# Patient Record
Sex: MALE | Race: WHITE | NOT HISPANIC OR LATINO
[De-identification: names, ages, dates, MRNs, and addresses within clinical notes are randomized per-mention and may not be internally consistent; named-entity substitution may affect disease eponyms.]

---

## 2019-07-26 ENCOUNTER — TRANSCRIPTION ENCOUNTER (OUTPATIENT)
Age: 34
End: 2019-07-26

## 2020-02-06 ENCOUNTER — TRANSCRIPTION ENCOUNTER (OUTPATIENT)
Age: 35
End: 2020-02-06

## 2020-08-15 ENCOUNTER — TRANSCRIPTION ENCOUNTER (OUTPATIENT)
Age: 35
End: 2020-08-15

## 2021-06-09 ENCOUNTER — EMERGENCY (EMERGENCY)
Facility: HOSPITAL | Age: 36
LOS: 1 days | Discharge: HOME | End: 2021-06-09
Attending: EMERGENCY MEDICINE | Admitting: EMERGENCY MEDICINE
Payer: COMMERCIAL

## 2021-06-09 VITALS
OXYGEN SATURATION: 98 % | HEART RATE: 80 BPM | DIASTOLIC BLOOD PRESSURE: 86 MMHG | WEIGHT: 184.97 LBS | TEMPERATURE: 98 F | SYSTOLIC BLOOD PRESSURE: 119 MMHG | RESPIRATION RATE: 18 BRPM

## 2021-06-09 VITALS
OXYGEN SATURATION: 99 % | SYSTOLIC BLOOD PRESSURE: 120 MMHG | HEART RATE: 69 BPM | RESPIRATION RATE: 18 BRPM | DIASTOLIC BLOOD PRESSURE: 77 MMHG | TEMPERATURE: 98 F

## 2021-06-09 DIAGNOSIS — Z87.442 PERSONAL HISTORY OF URINARY CALCULI: ICD-10-CM

## 2021-06-09 DIAGNOSIS — N13.2 HYDRONEPHROSIS WITH RENAL AND URETERAL CALCULOUS OBSTRUCTION: ICD-10-CM

## 2021-06-09 DIAGNOSIS — R31.9 HEMATURIA, UNSPECIFIED: ICD-10-CM

## 2021-06-09 DIAGNOSIS — E10.9 TYPE 1 DIABETES MELLITUS WITHOUT COMPLICATIONS: ICD-10-CM

## 2021-06-09 LAB
ALBUMIN SERPL ELPH-MCNC: 3.9 G/DL — SIGNIFICANT CHANGE UP (ref 3.3–5)
ALP SERPL-CCNC: 65 U/L — SIGNIFICANT CHANGE UP (ref 40–120)
ALT FLD-CCNC: 12 U/L — SIGNIFICANT CHANGE UP (ref 10–45)
ANION GAP SERPL CALC-SCNC: 9 MMOL/L — SIGNIFICANT CHANGE UP (ref 5–17)
APPEARANCE UR: CLEAR — SIGNIFICANT CHANGE UP
AST SERPL-CCNC: 13 U/L — SIGNIFICANT CHANGE UP (ref 10–40)
BACTERIA # UR AUTO: PRESENT /HPF
BASOPHILS # BLD AUTO: 0.05 K/UL — SIGNIFICANT CHANGE UP (ref 0–0.2)
BASOPHILS NFR BLD AUTO: 0.7 % — SIGNIFICANT CHANGE UP (ref 0–2)
BILIRUB SERPL-MCNC: 0.4 MG/DL — SIGNIFICANT CHANGE UP (ref 0.2–1.2)
BILIRUB UR-MCNC: NEGATIVE — SIGNIFICANT CHANGE UP
BUN SERPL-MCNC: 15 MG/DL — SIGNIFICANT CHANGE UP (ref 7–23)
CALCIUM SERPL-MCNC: 9.2 MG/DL — SIGNIFICANT CHANGE UP (ref 8.4–10.5)
CHLORIDE SERPL-SCNC: 103 MMOL/L — SIGNIFICANT CHANGE UP (ref 96–108)
CO2 SERPL-SCNC: 26 MMOL/L — SIGNIFICANT CHANGE UP (ref 22–31)
COLOR SPEC: YELLOW — SIGNIFICANT CHANGE UP
CREAT SERPL-MCNC: 0.68 MG/DL — SIGNIFICANT CHANGE UP (ref 0.5–1.3)
DIFF PNL FLD: ABNORMAL
EOSINOPHIL # BLD AUTO: 0.22 K/UL — SIGNIFICANT CHANGE UP (ref 0–0.5)
EOSINOPHIL NFR BLD AUTO: 2.9 % — SIGNIFICANT CHANGE UP (ref 0–6)
EPI CELLS # UR: SIGNIFICANT CHANGE UP /HPF (ref 0–5)
GLUCOSE SERPL-MCNC: 221 MG/DL — HIGH (ref 70–99)
GLUCOSE UR QL: 500
HCT VFR BLD CALC: 43.2 % — SIGNIFICANT CHANGE UP (ref 39–50)
HGB BLD-MCNC: 15.3 G/DL — SIGNIFICANT CHANGE UP (ref 13–17)
IMM GRANULOCYTES NFR BLD AUTO: 0.5 % — SIGNIFICANT CHANGE UP (ref 0–1.5)
KETONES UR-MCNC: NEGATIVE — SIGNIFICANT CHANGE UP
LEUKOCYTE ESTERASE UR-ACNC: ABNORMAL
LIDOCAIN IGE QN: 24 U/L — SIGNIFICANT CHANGE UP (ref 7–60)
LYMPHOCYTES # BLD AUTO: 2.07 K/UL — SIGNIFICANT CHANGE UP (ref 1–3.3)
LYMPHOCYTES # BLD AUTO: 27.3 % — SIGNIFICANT CHANGE UP (ref 13–44)
MCHC RBC-ENTMCNC: 32.1 PG — SIGNIFICANT CHANGE UP (ref 27–34)
MCHC RBC-ENTMCNC: 35.4 GM/DL — SIGNIFICANT CHANGE UP (ref 32–36)
MCV RBC AUTO: 90.8 FL — SIGNIFICANT CHANGE UP (ref 80–100)
MONOCYTES # BLD AUTO: 0.51 K/UL — SIGNIFICANT CHANGE UP (ref 0–0.9)
MONOCYTES NFR BLD AUTO: 6.7 % — SIGNIFICANT CHANGE UP (ref 2–14)
NEUTROPHILS # BLD AUTO: 4.68 K/UL — SIGNIFICANT CHANGE UP (ref 1.8–7.4)
NEUTROPHILS NFR BLD AUTO: 61.9 % — SIGNIFICANT CHANGE UP (ref 43–77)
NITRITE UR-MCNC: NEGATIVE — SIGNIFICANT CHANGE UP
NRBC # BLD: 0 /100 WBCS — SIGNIFICANT CHANGE UP (ref 0–0)
PH UR: 6 — SIGNIFICANT CHANGE UP (ref 5–8)
PLATELET # BLD AUTO: 242 K/UL — SIGNIFICANT CHANGE UP (ref 150–400)
POTASSIUM SERPL-MCNC: 4.5 MMOL/L — SIGNIFICANT CHANGE UP (ref 3.5–5.3)
POTASSIUM SERPL-SCNC: 4.5 MMOL/L — SIGNIFICANT CHANGE UP (ref 3.5–5.3)
PROT SERPL-MCNC: 6.6 G/DL — SIGNIFICANT CHANGE UP (ref 6–8.3)
PROT UR-MCNC: 100 MG/DL
RBC # BLD: 4.76 M/UL — SIGNIFICANT CHANGE UP (ref 4.2–5.8)
RBC # FLD: 11.7 % — SIGNIFICANT CHANGE UP (ref 10.3–14.5)
RBC CASTS # UR COMP ASSIST: ABNORMAL /HPF
SODIUM SERPL-SCNC: 138 MMOL/L — SIGNIFICANT CHANGE UP (ref 135–145)
SP GR SPEC: >=1.03 — SIGNIFICANT CHANGE UP (ref 1–1.03)
UROBILINOGEN FLD QL: 0.2 E.U./DL — SIGNIFICANT CHANGE UP
WBC # BLD: 7.57 K/UL — SIGNIFICANT CHANGE UP (ref 3.8–10.5)
WBC # FLD AUTO: 7.57 K/UL — SIGNIFICANT CHANGE UP (ref 3.8–10.5)
WBC UR QL: > 10 /HPF

## 2021-06-09 PROCEDURE — 74176 CT ABD & PELVIS W/O CONTRAST: CPT | Mod: 26,MG

## 2021-06-09 PROCEDURE — 99284 EMERGENCY DEPT VISIT MOD MDM: CPT | Mod: 25

## 2021-06-09 PROCEDURE — 83690 ASSAY OF LIPASE: CPT

## 2021-06-09 PROCEDURE — 80053 COMPREHEN METABOLIC PANEL: CPT

## 2021-06-09 PROCEDURE — 85025 COMPLETE CBC W/AUTO DIFF WBC: CPT

## 2021-06-09 PROCEDURE — 87086 URINE CULTURE/COLONY COUNT: CPT

## 2021-06-09 PROCEDURE — 82962 GLUCOSE BLOOD TEST: CPT

## 2021-06-09 PROCEDURE — 96375 TX/PRO/DX INJ NEW DRUG ADDON: CPT

## 2021-06-09 PROCEDURE — 99242 OFF/OP CONSLTJ NEW/EST SF 20: CPT

## 2021-06-09 PROCEDURE — 74176 CT ABD & PELVIS W/O CONTRAST: CPT

## 2021-06-09 PROCEDURE — 36415 COLL VENOUS BLD VENIPUNCTURE: CPT

## 2021-06-09 PROCEDURE — 96374 THER/PROPH/DIAG INJ IV PUSH: CPT

## 2021-06-09 PROCEDURE — 81001 URINALYSIS AUTO W/SCOPE: CPT

## 2021-06-09 PROCEDURE — 99285 EMERGENCY DEPT VISIT HI MDM: CPT

## 2021-06-09 PROCEDURE — G1004: CPT

## 2021-06-09 RX ORDER — SODIUM CHLORIDE 9 MG/ML
1000 INJECTION INTRAMUSCULAR; INTRAVENOUS; SUBCUTANEOUS ONCE
Refills: 0 | Status: COMPLETED | OUTPATIENT
Start: 2021-06-09 | End: 2021-06-09

## 2021-06-09 RX ORDER — CEFPODOXIME PROXETIL 100 MG
1 TABLET ORAL
Qty: 14 | Refills: 0
Start: 2021-06-09 | End: 2021-06-15

## 2021-06-09 RX ORDER — OXYCODONE AND ACETAMINOPHEN 5; 325 MG/1; MG/1
1 TABLET ORAL
Qty: 12 | Refills: 0
Start: 2021-06-09 | End: 2021-06-12

## 2021-06-09 RX ORDER — MORPHINE SULFATE 50 MG/1
4 CAPSULE, EXTENDED RELEASE ORAL ONCE
Refills: 0 | Status: DISCONTINUED | OUTPATIENT
Start: 2021-06-09 | End: 2021-06-09

## 2021-06-09 RX ORDER — KETOROLAC TROMETHAMINE 30 MG/ML
15 SYRINGE (ML) INJECTION ONCE
Refills: 0 | Status: DISCONTINUED | OUTPATIENT
Start: 2021-06-09 | End: 2021-06-09

## 2021-06-09 RX ORDER — IBUPROFEN 200 MG
1 TABLET ORAL
Qty: 30 | Refills: 0
Start: 2021-06-09 | End: 2021-06-18

## 2021-06-09 RX ORDER — CEFTRIAXONE 500 MG/1
1000 INJECTION, POWDER, FOR SOLUTION INTRAMUSCULAR; INTRAVENOUS ONCE
Refills: 0 | Status: COMPLETED | OUTPATIENT
Start: 2021-06-09 | End: 2021-06-09

## 2021-06-09 RX ADMIN — MORPHINE SULFATE 4 MILLIGRAM(S): 50 CAPSULE, EXTENDED RELEASE ORAL at 18:07

## 2021-06-09 RX ADMIN — SODIUM CHLORIDE 1000 MILLILITER(S): 9 INJECTION INTRAMUSCULAR; INTRAVENOUS; SUBCUTANEOUS at 15:42

## 2021-06-09 RX ADMIN — Medication 15 MILLIGRAM(S): at 15:45

## 2021-06-09 RX ADMIN — CEFTRIAXONE 100 MILLIGRAM(S): 500 INJECTION, POWDER, FOR SOLUTION INTRAMUSCULAR; INTRAVENOUS at 18:07

## 2021-06-09 NOTE — CONSULT NOTE ADULT - PROBLEM SELECTOR RECOMMENDATION 9
-stable  -no acute urological intervention at this time  -can d/c home with pain medication  -f/u as outpt with urologist -stable  -no acute urological intervention at this time  -can d/c home with pain medication  -antibx per ED provider  -f/u Dr. Iverson for definitive stone management

## 2021-06-09 NOTE — ED PROVIDER NOTE - ATTENDING CONTRIBUTION TO CARE
24 F DM on insulin- w ho kidney stones- L flank pain int x 2 months  mod-severe- blood in urine and worsening pain today  vss  s1s2 lungs cta bl  abd soft nt nd +bs  ext no c/c/e  IMP- CT- Large stone w hydro  pain control, ivf, urology consult

## 2021-06-09 NOTE — ED PROVIDER NOTE - NSFOLLOWUPINSTRUCTIONS_ED_ALL_ED_FT
KIDNEY STONES - AfterCare(R) Instructions(ER/ED)           Kidney Stones    WHAT YOU NEED TO KNOW:    Kidney stones form in the urinary system when the water and waste in your urine are out of balance. When this happens, certain types of waste crystals separate from the urine. The crystals build up and form kidney stones. You may have more than one kidney stone.    Kidney Stones          DISCHARGE INSTRUCTIONS:    Return to the emergency department if:   •You are vomiting and it is not relieved with medicine.          Call your doctor or kidney specialist if:   •You have a fever.      •You have trouble urinating.      •You see blood in your urine.      •You have severe pain.      •You have any questions or concerns about your condition or care.      Medicines: You may need any of the following:  •NSAIDs, such as ibuprofen, help decrease swelling, pain, and fever. This medicine is available with or without a doctor's order. NSAIDs can cause stomach bleeding or kidney problems in certain people. If you take blood thinner medicine, always ask your healthcare provider if NSAIDs are safe for you. Always read the medicine label and follow directions.      •Acetaminophen decreases pain and fever. It is available without a doctor's order. Ask how much to take and how often to take it. Follow directions. Read the labels of all other medicines you are using to see if they also contain acetaminophen, or ask your doctor or pharmacist. Acetaminophen can cause liver damage if not taken correctly. Do not use more than 4 grams (4,000 milligrams) total of acetaminophen in one day.       •Prescription pain medicine may be given. Ask your healthcare provider how to take this medicine safely. Some prescription pain medicines contain acetaminophen. Do not take other medicines that contain acetaminophen without talking to your healthcare provider. Too much acetaminophen may cause liver damage. Prescription pain medicine may cause constipation. Ask your healthcare provider how to prevent or treat constipation.       •Medicines to balance your electrolytes may be needed.      •Take your medicine as directed. Contact your healthcare provider if you think your medicine is not helping or if you have side effects. Tell him or her if you are allergic to any medicine. Keep a list of the medicines, vitamins, and herbs you take. Include the amounts, and when and why you take them. Bring the list or the pill bottles to follow-up visits. Carry your medicine list with you in case of an emergency.      What you can do to manage kidney stones:   •Drink more liquids. Your healthcare provider may tell you to drink at least 8 to 12 (eight-ounce) cups of liquids each day. This helps flush out the kidney stones when you urinate. Water is the best liquid to drink.      •Strain your urine every time you go to the bathroom. Urinate through a strainer or a piece of thin cloth to catch the stones. Take the stones to your healthcare provider so they can be sent to the lab for tests. This will help your healthcare providers plan the best treatment for you.  Look for Stones in the Filter           •Eat a variety of healthy foods. Healthy foods include fruits, vegetables, whole-grain breads, low-fat dairy products, beans, and fish. You may need to limit how much sodium (salt) or protein you eat. Ask for information about the best foods for you.  Healthy Foods           •Be physically active as directed. Your stones may pass more easily if you stay active. Physical activity can also help you manage your weight. Ask about the best activities for you.  [employer] Walking for Exercise           After you pass the kidney stones: Your healthcare provider may order a 24-hour urine test. Results from a 24-hour urine test will help your healthcare provider plan ways to prevent more stones from forming. Your healthcare provider will give you more instructions.    Follow up with your doctor or kidney specialist as directed: Write down your questions so you remember to ask them during your visits.

## 2021-06-09 NOTE — CONSULT NOTE ADULT - SUBJECTIVE AND OBJECTIVE BOX
HPI: 37 yo male with h/o kidney stones and DM1, presents to ED c/o worsening L flank pain and hematuria. Patient has been having on and off L flank discomfort usually after eating which subsides but worsened today to 7/10 pain. +noticed some blood in urine. No dysuria/hematuria. No fever/chills. No CP/SOB. NO N/V.  CT a/p c/w 2cm L UPJ stone with moderate hydronephrosis. WBC and Cr nml. Patient feels better with pain medications.     PAST MEDICAL & SURGICAL HISTORY: DM1, kidney stones      MEDICATIONS  (STANDING):  cefTRIAXone   IVPB 1000 milliGRAM(s) IV Intermittent once  morphine  - Injectable 4 milliGRAM(s) IV Push Once    MEDICATIONS  (PRN):      Allergies    No Known Allergies    Intolerances        SOCIAL HISTORY:    FAMILY HISTORY:      Vital Signs Last 24 Hrs  T(C): 36.7 (09 Jun 2021 15:05), Max: 36.7 (09 Jun 2021 15:05)  T(F): 98 (09 Jun 2021 15:05), Max: 98 (09 Jun 2021 15:05)  HR: 80 (09 Jun 2021 15:05) (80 - 80)  BP: 119/86 (09 Jun 2021 15:05) (119/86 - 119/86)  BP(mean): --  RR: 18 (09 Jun 2021 15:05) (18 - 18)  SpO2: 98% (09 Jun 2021 15:05) (98% - 98%)    On PE:  General: alert and awake  Abdomen: soft, NT, ND    : no SP or CVAT    EXT: no edema    LABS:                        15.3   7.57  )-----------( 242      ( 09 Jun 2021 15:43 )             43.2     06-09    138  |  103  |  15  ----------------------------<  221<H>  4.5   |  26  |  0.68    Ca    9.2      09 Jun 2021 15:43    TPro  6.6  /  Alb  3.9  /  TBili  0.4  /  DBili  x   /  AST  13  /  ALT  12  /  AlkPhos  65  06-09      Urinalysis Basic - ( 09 Jun 2021 15:44 )    Color: x / Appearance: x / SG: x / pH: x  Gluc: x / Ketone: x  / Bili: x / Urobili: x   Blood: x / Protein: x / Nitrite: x   Leuk Esterase: x / RBC: Many /HPF / WBC > 10 /HPF   Sq Epi: x / Non Sq Epi: 0-5 /HPF / Bacteria: Present /HPF        RADIOLOGY & ADDITIONAL STUDIES:

## 2021-06-09 NOTE — ED PROVIDER NOTE - CLINICAL SUMMARY MEDICAL DECISION MAKING FREE TEXT BOX
35 y/o M with intermittent flank pain for the past few months w/ worsening today with new onset hematuria. Similar to prior hx of kidney stones. No nausea, vomiting, diarrhea. + LCVAT on exam. Plan for labs including kidney function, give fluids, get CT scan to evaluate for kidney stone.

## 2021-06-09 NOTE — ED PROVIDER NOTE - CARE PROVIDER_API CALL
Shakir Solis  Urology  [address], [place] [address]  Phone: [phone]  Fax: [phone]  Follow Up Time:     Cole Gay)  Urology  [address]  [place] [address]  Phone: [phone]  Fax: [phone]  Follow Up Time:

## 2021-06-09 NOTE — ED PROVIDER NOTE - CARE PROVIDERS DIRECT ADDRESSES
[email or web address]

## 2021-06-09 NOTE — ED PROVIDER NOTE - OBJECTIVE STATEMENT
35 y/o M with Hx of T1DM (taking Tresiba)  intermittent L flank pain on and off for the past few months, worsened today with new hematuria, currently 7/10. Patient notes has had kidney stones and this pain is similar. No procedures in past for kidney stones, notes always had passed. Denies fever, chills, nausea, vomiting, diarrhea, chest pain, SOB, cough. Notes remote hx of hematuria with prior kidney stones.

## 2021-06-09 NOTE — ED ADULT NURSE NOTE - OBJECTIVE STATEMENT
pt c/o left flank pain and hematuria today pt c/o left flank pain and hematuria today, states that he is having pain x 2 months in left side radiating to left flank

## 2021-06-09 NOTE — ED PROVIDER NOTE - NSFOLLOWUPCLINICS_GEN_ALL_ED_FT
[facility]  Urology  [address], [address]  [place], [place] [address]  Phone: [phone]  Fax:

## 2021-06-10 LAB
CULTURE RESULTS: NO GROWTH — SIGNIFICANT CHANGE UP
SPECIMEN SOURCE: SIGNIFICANT CHANGE UP

## 2021-06-16 ENCOUNTER — LABORATORY RESULT (OUTPATIENT)
Age: 36
End: 2021-06-16

## 2021-06-16 ENCOUNTER — APPOINTMENT (OUTPATIENT)
Dept: UROLOGY | Facility: CLINIC | Age: 36
End: 2021-06-16
Payer: COMMERCIAL

## 2021-06-16 VITALS
DIASTOLIC BLOOD PRESSURE: 88 MMHG | WEIGHT: 185 LBS | HEIGHT: 72 IN | RESPIRATION RATE: 16 BRPM | BODY MASS INDEX: 25.06 KG/M2 | SYSTOLIC BLOOD PRESSURE: 128 MMHG | TEMPERATURE: 97.4 F | HEART RATE: 89 BPM | OXYGEN SATURATION: 99 %

## 2021-06-16 DIAGNOSIS — F17.200 NICOTINE DEPENDENCE, UNSPECIFIED, UNCOMPLICATED: ICD-10-CM

## 2021-06-16 DIAGNOSIS — E78.00 PURE HYPERCHOLESTEROLEMIA, UNSPECIFIED: ICD-10-CM

## 2021-06-16 DIAGNOSIS — Z60.2 PROBLEMS RELATED TO LIVING ALONE: ICD-10-CM

## 2021-06-16 DIAGNOSIS — F98.8 OTHER SPECIFIED BEHAVIORAL AND EMOTIONAL DISORDERS WITH ONSET USUALLY OCCURRING IN CHILDHOOD AND ADOLESCENCE: ICD-10-CM

## 2021-06-16 DIAGNOSIS — E11.9 TYPE 2 DIABETES MELLITUS W/OUT COMPLICATIONS: ICD-10-CM

## 2021-06-16 DIAGNOSIS — N13.30 UNSPECIFIED HYDRONEPHROSIS: ICD-10-CM

## 2021-06-16 PROCEDURE — 99072 ADDL SUPL MATRL&STAF TM PHE: CPT

## 2021-06-16 PROCEDURE — 99204 OFFICE O/P NEW MOD 45 MIN: CPT

## 2021-06-16 RX ORDER — ETHYL ALCOHOL 70 %
SOLUTION, NON-ORAL TOPICAL
Refills: 0 | Status: ACTIVE | COMMUNITY

## 2021-06-16 RX ORDER — DEXTROAMPHETAMINE SULFATE, DEXTROAMPHETAMINE SACCHARATE, AMPHETAMINE SULFATE AND AMPHETAMINE ASPARTATE 5; 5; 5; 5 MG/1; MG/1; MG/1; MG/1
20 CAPSULE, EXTENDED RELEASE ORAL
Refills: 0 | Status: ACTIVE | COMMUNITY

## 2021-06-16 RX ORDER — IBUPROFEN 600 MG/1
600 TABLET, FILM COATED ORAL
Refills: 0 | Status: ACTIVE | COMMUNITY

## 2021-06-16 RX ORDER — LISINOPRIL 20 MG/1
20 TABLET ORAL
Refills: 0 | Status: ACTIVE | COMMUNITY

## 2021-06-16 RX ORDER — INSULIN HUMAN 12 1/1
12 POWDER, METERED RESPIRATORY (INHALATION)
Refills: 0 | Status: ACTIVE | COMMUNITY

## 2021-06-16 RX ORDER — ZALEPLON 10 MG/1
CAPSULE ORAL
Refills: 0 | Status: ACTIVE | COMMUNITY

## 2021-06-16 SDOH — SOCIAL STABILITY - SOCIAL INSECURITY: PROBLEMS RELATED TO LIVING ALONE: Z60.2

## 2021-06-16 NOTE — PHYSICAL EXAM
[General Appearance - Well Developed] : well developed [Normal Appearance] : normal appearance [General Appearance - In No Acute Distress] : no acute distress [Heart Rate And Rhythm] : Heart rate and rhythm were normal [] : no respiratory distress [Abdomen Soft] : soft [Abdomen Tenderness] : non-tender [Abdomen Hernia] : no hernia was discovered [Costovertebral Angle Tenderness] : no ~M costovertebral angle tenderness [Urethral Meatus] : meatus normal [Penis Abnormality] : normal circumcised penis [Epididymis] : the epididymides were normal [Testes Tenderness] : no tenderness of the testes [Testes Mass (___cm)] : there were no testicular masses [Normal Station and Gait] : the gait and station were normal for the patient's age [Skin Color & Pigmentation] : normal skin color and pigmentation [No Focal Deficits] : no focal deficits [Oriented To Time, Place, And Person] : oriented to person, place, and time

## 2021-06-16 NOTE — HISTORY OF PRESENT ILLNESS
[FreeTextEntry1] : 35 yo male referred for management of a left renal stone diagnosed last week when patient went to the ED for left sided flank pain. A CT scan in the ED showed a 2 cm left UPJ stone and a 5 mm left lower pole stone.  Mild hydronephrosis was noted.  \par \par  He has had intermittent flank pain for 2 months.  The pain became more intense and persistent last week.  He denies any associated nausea, vomiting, fevers, or chills.  His pain has been relatively mild since his ED visit.  he denies any voiding complaints.  he did have hematuria prior to his ED visit, but he has had clear urine since then.  His lab work was all WNL and his Ucx was negative.  \par \par He notes a personal hx of stone twice in the last 10 years.  He passed both stones.  His mother had a stone last year.

## 2021-06-16 NOTE — ASSESSMENT
[FreeTextEntry1] : 37 yo male with mildly symptomatic  2 cm left UPJ stone and a 5 mm left lower pole stone with mild left hydronephrosis.\par \par The patient was seen and examined and results were reviewed. \par \par Kidney stone disease was reviewed with the patient and etiologies/risk factors were discussed. Preventative measures were discussed including hydration, diet modification, and medications.Options for management were reviewed including conservative management (no intervention), medical expulsive therapy, and surgical management. Merits and limitations of each option was discussed. \par \par The possibility of spontaneous stone passage based on stone size and location was reviewed. Risks of spontaneous passage including pain, hematuria, fever, chills, nausea/emesis, infection, urosepsis, dehydration, ureteral or renal injury, need for repeat office or emergency room visits, and permanent loss of renal function were reviewed.\par \par Surgical options were presented including ureteral stent placement, extracorporeal shockwave lithotripsy (ESWL), ureteroscopy with laser lithotripsy, and percutaneous nephrolithotomy. The procedures were discussed in depth. Success rates, complications, and potential for subsequent procedures was discussed for each option.The role of imaging studies including ultrasound, plain film x-rays, and CT scan were discussed, as well as potential radiation exposure risks. Metabolic evaluation with serum chemistry and 24 hour urine collections were presented for the purpose of determining the etiology of the patient's stone forming risk factors.\par \par The patient wishes to proceed with left PCNL. He will be scheduled to se IR the day of the procedure for percutaneous access to be obtained.  he understands he will have a left nephrostomy tube and likely and left ureteral stent, temporarily, after the procedure.  \par

## 2021-06-17 PROBLEM — E10.9 TYPE 1 DIABETES MELLITUS WITHOUT COMPLICATIONS: Chronic | Status: ACTIVE | Noted: 2021-06-14

## 2021-06-28 RX ORDER — OXYCODONE AND ACETAMINOPHEN 5; 325 MG/1; MG/1
5-325 TABLET ORAL
Qty: 10 | Refills: 0 | Status: ACTIVE | COMMUNITY
Start: 2021-06-28 | End: 1900-01-01

## 2021-06-29 ENCOUNTER — OUTPATIENT (OUTPATIENT)
Dept: OUTPATIENT SERVICES | Facility: HOSPITAL | Age: 36
LOS: 1 days | End: 2021-06-29
Payer: COMMERCIAL

## 2021-06-29 ENCOUNTER — APPOINTMENT (OUTPATIENT)
Dept: UROLOGY | Facility: AMBULATORY SURGERY CENTER | Age: 36
End: 2021-06-29

## 2021-06-29 VITALS
HEIGHT: 72 IN | RESPIRATION RATE: 16 BRPM | SYSTOLIC BLOOD PRESSURE: 122 MMHG | DIASTOLIC BLOOD PRESSURE: 87 MMHG | HEART RATE: 94 BPM | WEIGHT: 169.32 LBS | TEMPERATURE: 97 F | OXYGEN SATURATION: 97 %

## 2021-06-29 PROBLEM — F90.9 ATTENTION-DEFICIT HYPERACTIVITY DISORDER, UNSPECIFIED TYPE: Chronic | Status: ACTIVE | Noted: 2021-06-28

## 2021-06-29 PROBLEM — E78.5 HYPERLIPIDEMIA, UNSPECIFIED: Chronic | Status: ACTIVE | Noted: 2021-06-28

## 2021-06-29 LAB
ANION GAP SERPL CALC-SCNC: 18 MMOL/L — HIGH (ref 5–17)
APPEARANCE UR: CLEAR — SIGNIFICANT CHANGE UP
BACTERIA # UR AUTO: PRESENT /HPF
BILIRUB UR-MCNC: ABNORMAL
BUN SERPL-MCNC: 27 MG/DL — HIGH (ref 7–23)
CALCIUM SERPL-MCNC: 10.2 MG/DL — SIGNIFICANT CHANGE UP (ref 8.4–10.5)
CHLORIDE SERPL-SCNC: 95 MMOL/L — LOW (ref 96–108)
CO2 SERPL-SCNC: 23 MMOL/L — SIGNIFICANT CHANGE UP (ref 22–31)
COLOR SPEC: YELLOW — SIGNIFICANT CHANGE UP
COMMENT - URINE: SIGNIFICANT CHANGE UP
CREAT SERPL-MCNC: 1.1 MG/DL — SIGNIFICANT CHANGE UP (ref 0.5–1.3)
DIFF PNL FLD: ABNORMAL
EPI CELLS # UR: SIGNIFICANT CHANGE UP /HPF (ref 0–5)
GLUCOSE BLDC GLUCOMTR-MCNC: 327 MG/DL — HIGH (ref 70–99)
GLUCOSE BLDC GLUCOMTR-MCNC: 364 MG/DL — HIGH (ref 70–99)
GLUCOSE BLDC GLUCOMTR-MCNC: 383 MG/DL — HIGH (ref 70–99)
GLUCOSE BLDC GLUCOMTR-MCNC: 390 MG/DL — HIGH (ref 70–99)
GLUCOSE SERPL-MCNC: 378 MG/DL — HIGH (ref 70–99)
GLUCOSE UR QL: >=1000
HYALINE CASTS # UR AUTO: SIGNIFICANT CHANGE UP /LPF (ref 0–2)
KETONES UR-MCNC: 15 MG/DL
LEUKOCYTE ESTERASE UR-ACNC: NEGATIVE — SIGNIFICANT CHANGE UP
NITRITE UR-MCNC: NEGATIVE — SIGNIFICANT CHANGE UP
PH UR: 5.5 — SIGNIFICANT CHANGE UP (ref 5–8)
POTASSIUM SERPL-MCNC: 4.6 MMOL/L — SIGNIFICANT CHANGE UP (ref 3.5–5.3)
POTASSIUM SERPL-SCNC: 4.6 MMOL/L — SIGNIFICANT CHANGE UP (ref 3.5–5.3)
PROT UR-MCNC: 100 MG/DL
RBC CASTS # UR COMP ASSIST: < 5 /HPF — SIGNIFICANT CHANGE UP
SODIUM SERPL-SCNC: 136 MMOL/L — SIGNIFICANT CHANGE UP (ref 135–145)
SP GR SPEC: >=1.03 — SIGNIFICANT CHANGE UP (ref 1–1.03)
UROBILINOGEN FLD QL: 0.2 E.U./DL — SIGNIFICANT CHANGE UP
WBC UR QL: > 10 /HPF

## 2021-06-29 RX ORDER — DEXTROSE 50 % IN WATER 50 %
12.5 SYRINGE (ML) INTRAVENOUS ONCE
Refills: 0 | Status: DISCONTINUED | OUTPATIENT
Start: 2021-06-29 | End: 2021-06-30

## 2021-06-29 RX ORDER — DEXTROSE 50 % IN WATER 50 %
25 SYRINGE (ML) INTRAVENOUS ONCE
Refills: 0 | Status: DISCONTINUED | OUTPATIENT
Start: 2021-06-29 | End: 2021-06-30

## 2021-06-29 RX ORDER — DEXTROSE 50 % IN WATER 50 %
15 SYRINGE (ML) INTRAVENOUS ONCE
Refills: 0 | Status: DISCONTINUED | OUTPATIENT
Start: 2021-06-29 | End: 2021-06-30

## 2021-06-29 RX ORDER — INSULIN LISPRO 100/ML
VIAL (ML) SUBCUTANEOUS AT BEDTIME
Refills: 0 | Status: DISCONTINUED | OUTPATIENT
Start: 2021-06-29 | End: 2021-06-30

## 2021-06-29 RX ORDER — INSULIN LISPRO 100/ML
VIAL (ML) SUBCUTANEOUS
Refills: 0 | Status: DISCONTINUED | OUTPATIENT
Start: 2021-06-29 | End: 2021-06-30

## 2021-06-29 RX ORDER — SODIUM CHLORIDE 9 MG/ML
1000 INJECTION, SOLUTION INTRAVENOUS
Refills: 0 | Status: DISCONTINUED | OUTPATIENT
Start: 2021-06-29 | End: 2021-06-30

## 2021-06-29 RX ORDER — GLUCAGON INJECTION, SOLUTION 0.5 MG/.1ML
1 INJECTION, SOLUTION SUBCUTANEOUS ONCE
Refills: 0 | Status: DISCONTINUED | OUTPATIENT
Start: 2021-06-29 | End: 2021-06-30

## 2021-06-29 RX ADMIN — Medication 10: at 08:21

## 2021-06-29 NOTE — PATIENT PROFILE ADULT - PACKS YRS CALCULATION
Head CT:    No CT evidence of acute intracranial hemorrhage, mass or infarct.   Follow-up MRI can be obtained as clinically warranted.
270

## 2021-06-29 NOTE — PRE-OP CHECKLIST - SELECT TESTS ORDERED
383/CBC/PT/PTT/INR/Urinalysis/EKG/POCT Blood Glucose 383 and repeated at 08:00 364mg dl/CBC/PT/PTT/INR/Urinalysis/EKG/Sickle Cell (black patients 3mos-10yr)/POCT Blood Glucose

## 2021-06-29 NOTE — PROGRESS NOTE ADULT - SUBJECTIVE AND OBJECTIVE BOX
37 yo m scheduled for elective left percutaneous nephrolithotomy today, pain controlled and febrile. FS at preop was 390, patient administered 10 units of insulin, FS rechecked 2 hours later at 10 am and still 326, after consulting with patients own endocrinologist ( Dr Jael Cardenas) decision was made to cancel the case due to elevated FS.  He will follow up with his endocrinologist for optimization and will tentatively reschedule for friday. Discussed with  attending.

## 2021-07-01 ENCOUNTER — TRANSCRIPTION ENCOUNTER (OUTPATIENT)
Age: 36
End: 2021-07-01

## 2021-07-01 VITALS
SYSTOLIC BLOOD PRESSURE: 120 MMHG | HEIGHT: 72 IN | WEIGHT: 174.17 LBS | RESPIRATION RATE: 16 BRPM | TEMPERATURE: 98 F | DIASTOLIC BLOOD PRESSURE: 79 MMHG | HEART RATE: 84 BPM | OXYGEN SATURATION: 97 %

## 2021-07-01 NOTE — ASU PATIENT PROFILE, ADULT - PMH
ADHD (attention deficit hyperactivity disorder)    HLD (hyperlipidemia)    T1DM (type 1 diabetes mellitus)

## 2021-07-01 NOTE — ASU PREOP CHECKLIST - 1.
pt type 1 DM , and stated that was he was given instructions from his MD regarding insulin and diabetes management.

## 2021-07-01 NOTE — ASU PREOP CHECKLIST - 4.
10;50am Received pt from PCNL awake alert & oriented. No complaints offered. Yj=464/86  P=77  R=16 T=97.9  PP=395 Dr Gay made aware. Pt taken to OR accompanied by Team.

## 2021-07-02 ENCOUNTER — RESULT REVIEW (OUTPATIENT)
Age: 36
End: 2021-07-02

## 2021-07-02 ENCOUNTER — APPOINTMENT (OUTPATIENT)
Dept: UROLOGY | Facility: CLINIC | Age: 36
End: 2021-07-02

## 2021-07-02 ENCOUNTER — INPATIENT (INPATIENT)
Facility: HOSPITAL | Age: 36
LOS: 0 days | Discharge: HOME | DRG: 661 | End: 2021-07-03
Attending: UROLOGY | Admitting: UROLOGY
Payer: COMMERCIAL

## 2021-07-02 ENCOUNTER — APPOINTMENT (OUTPATIENT)
Dept: UROLOGY | Facility: HOSPITAL | Age: 36
End: 2021-07-02

## 2021-07-02 ENCOUNTER — APPOINTMENT (OUTPATIENT)
Dept: INTERVENTIONAL RADIOLOGY/VASCULAR | Facility: HOSPITAL | Age: 36
End: 2021-07-02

## 2021-07-02 LAB
A1C WITH ESTIMATED AVERAGE GLUCOSE RESULT: 12 % — HIGH (ref 4–5.6)
ANION GAP SERPL CALC-SCNC: 8 MMOL/L — SIGNIFICANT CHANGE UP (ref 5–17)
BUN SERPL-MCNC: 16 MG/DL — SIGNIFICANT CHANGE UP (ref 7–23)
CALCIUM SERPL-MCNC: 8.8 MG/DL — SIGNIFICANT CHANGE UP (ref 8.4–10.5)
CHLORIDE SERPL-SCNC: 102 MMOL/L — SIGNIFICANT CHANGE UP (ref 96–108)
CO2 SERPL-SCNC: 26 MMOL/L — SIGNIFICANT CHANGE UP (ref 22–31)
CREAT SERPL-MCNC: 1.23 MG/DL — SIGNIFICANT CHANGE UP (ref 0.5–1.3)
ESTIMATED AVERAGE GLUCOSE: 298 MG/DL — HIGH (ref 68–114)
GLUCOSE BLDC GLUCOMTR-MCNC: 181 MG/DL — HIGH (ref 70–99)
GLUCOSE BLDC GLUCOMTR-MCNC: 207 MG/DL — HIGH (ref 70–99)
GLUCOSE BLDC GLUCOMTR-MCNC: 229 MG/DL — HIGH (ref 70–99)
GLUCOSE BLDC GLUCOMTR-MCNC: 260 MG/DL — HIGH (ref 70–99)
GLUCOSE BLDC GLUCOMTR-MCNC: 268 MG/DL — HIGH (ref 70–99)
GLUCOSE BLDC GLUCOMTR-MCNC: 276 MG/DL — HIGH (ref 70–99)
GLUCOSE BLDC GLUCOMTR-MCNC: 450 MG/DL — CRITICAL HIGH (ref 70–99)
GLUCOSE SERPL-MCNC: 245 MG/DL — HIGH (ref 70–99)
GRAM STN FLD: SIGNIFICANT CHANGE UP
HCT VFR BLD CALC: 37.9 % — LOW (ref 39–50)
HGB BLD-MCNC: 13.4 G/DL — SIGNIFICANT CHANGE UP (ref 13–17)
MAGNESIUM SERPL-MCNC: 2 MG/DL — SIGNIFICANT CHANGE UP (ref 1.6–2.6)
MCHC RBC-ENTMCNC: 31.3 PG — SIGNIFICANT CHANGE UP (ref 27–34)
MCHC RBC-ENTMCNC: 35.4 GM/DL — SIGNIFICANT CHANGE UP (ref 32–36)
MCV RBC AUTO: 88.6 FL — SIGNIFICANT CHANGE UP (ref 80–100)
NRBC # BLD: 0 /100 WBCS — SIGNIFICANT CHANGE UP (ref 0–0)
PHOSPHATE SERPL-MCNC: 3.6 MG/DL — SIGNIFICANT CHANGE UP (ref 2.5–4.5)
PLATELET # BLD AUTO: 159 K/UL — SIGNIFICANT CHANGE UP (ref 150–400)
POTASSIUM SERPL-MCNC: 4.6 MMOL/L — SIGNIFICANT CHANGE UP (ref 3.5–5.3)
POTASSIUM SERPL-SCNC: 4.6 MMOL/L — SIGNIFICANT CHANGE UP (ref 3.5–5.3)
RBC # BLD: 4.28 M/UL — SIGNIFICANT CHANGE UP (ref 4.2–5.8)
RBC # FLD: 11.9 % — SIGNIFICANT CHANGE UP (ref 10.3–14.5)
SODIUM SERPL-SCNC: 136 MMOL/L — SIGNIFICANT CHANGE UP (ref 135–145)
SPECIMEN SOURCE: SIGNIFICANT CHANGE UP
WBC # BLD: 6.37 K/UL — SIGNIFICANT CHANGE UP (ref 3.8–10.5)
WBC # FLD AUTO: 6.37 K/UL — SIGNIFICANT CHANGE UP (ref 3.8–10.5)

## 2021-07-02 PROCEDURE — 88300 SURGICAL PATH GROSS: CPT | Mod: 26

## 2021-07-02 PROCEDURE — 82365 CALCULUS SPECTROSCOPY: CPT

## 2021-07-02 PROCEDURE — 80048 BASIC METABOLIC PNL TOTAL CA: CPT

## 2021-07-02 PROCEDURE — 99222 1ST HOSP IP/OBS MODERATE 55: CPT | Mod: GC

## 2021-07-02 PROCEDURE — 36415 COLL VENOUS BLD VENIPUNCTURE: CPT

## 2021-07-02 PROCEDURE — 86850 RBC ANTIBODY SCREEN: CPT

## 2021-07-02 PROCEDURE — 50435 EXCHANGE NEPHROSTOMY CATH: CPT | Mod: LT

## 2021-07-02 PROCEDURE — 81001 URINALYSIS AUTO W/SCOPE: CPT

## 2021-07-02 PROCEDURE — 50437 DILAT XST TRC NEW ACCESS RCS: CPT | Mod: LT

## 2021-07-02 PROCEDURE — 86901 BLOOD TYPING SEROLOGIC RH(D): CPT

## 2021-07-02 PROCEDURE — 82962 GLUCOSE BLOOD TEST: CPT

## 2021-07-02 PROCEDURE — 86900 BLOOD TYPING SEROLOGIC ABO: CPT

## 2021-07-02 PROCEDURE — 50081 PERQ NL/PL LITHOTRP CPLX>2CM: CPT | Mod: LT

## 2021-07-02 RX ORDER — INSULIN LISPRO 100/ML
VIAL (ML) SUBCUTANEOUS
Refills: 0 | Status: DISCONTINUED | OUTPATIENT
Start: 2021-07-02 | End: 2021-07-02

## 2021-07-02 RX ORDER — SODIUM CHLORIDE 9 MG/ML
1000 INJECTION INTRAMUSCULAR; INTRAVENOUS; SUBCUTANEOUS
Refills: 0 | Status: DISCONTINUED | OUTPATIENT
Start: 2021-07-02 | End: 2021-07-03

## 2021-07-02 RX ORDER — INSULIN GLARGINE 100 [IU]/ML
14 INJECTION, SOLUTION SUBCUTANEOUS AT BEDTIME
Refills: 0 | Status: DISCONTINUED | OUTPATIENT
Start: 2021-07-02 | End: 2021-07-03

## 2021-07-02 RX ORDER — INSULIN GLARGINE 100 [IU]/ML
12 INJECTION, SOLUTION SUBCUTANEOUS AT BEDTIME
Refills: 0 | Status: DISCONTINUED | OUTPATIENT
Start: 2021-07-02 | End: 2021-07-02

## 2021-07-02 RX ORDER — DEXTROSE 50 % IN WATER 50 %
12.5 SYRINGE (ML) INTRAVENOUS ONCE
Refills: 0 | Status: DISCONTINUED | OUTPATIENT
Start: 2021-07-02 | End: 2021-07-03

## 2021-07-02 RX ORDER — INSULIN HUMAN 100 [IU]/ML
4 INJECTION, SOLUTION SUBCUTANEOUS
Refills: 0 | Status: DISCONTINUED | OUTPATIENT
Start: 2021-07-02 | End: 2021-07-02

## 2021-07-02 RX ORDER — DEXTROSE 50 % IN WATER 50 %
25 SYRINGE (ML) INTRAVENOUS ONCE
Refills: 0 | Status: DISCONTINUED | OUTPATIENT
Start: 2021-07-02 | End: 2021-07-03

## 2021-07-02 RX ORDER — INSULIN LISPRO 100/ML
8 VIAL (ML) SUBCUTANEOUS
Refills: 0 | Status: DISCONTINUED | OUTPATIENT
Start: 2021-07-02 | End: 2021-07-03

## 2021-07-02 RX ORDER — ONDANSETRON 8 MG/1
4 TABLET, FILM COATED ORAL EVERY 6 HOURS
Refills: 0 | Status: DISCONTINUED | OUTPATIENT
Start: 2021-07-02 | End: 2021-07-03

## 2021-07-02 RX ORDER — SODIUM CHLORIDE 9 MG/ML
1000 INJECTION, SOLUTION INTRAVENOUS
Refills: 0 | Status: DISCONTINUED | OUTPATIENT
Start: 2021-07-02 | End: 2021-07-03

## 2021-07-02 RX ORDER — HEPARIN SODIUM 5000 [USP'U]/ML
5000 INJECTION INTRAVENOUS; SUBCUTANEOUS EVERY 8 HOURS
Refills: 0 | Status: DISCONTINUED | OUTPATIENT
Start: 2021-07-02 | End: 2021-07-03

## 2021-07-02 RX ORDER — DEXTROSE 50 % IN WATER 50 %
15 SYRINGE (ML) INTRAVENOUS ONCE
Refills: 0 | Status: DISCONTINUED | OUTPATIENT
Start: 2021-07-02 | End: 2021-07-03

## 2021-07-02 RX ORDER — OXYCODONE AND ACETAMINOPHEN 5; 325 MG/1; MG/1
2 TABLET ORAL EVERY 6 HOURS
Refills: 0 | Status: DISCONTINUED | OUTPATIENT
Start: 2021-07-02 | End: 2021-07-03

## 2021-07-02 RX ORDER — OXYCODONE AND ACETAMINOPHEN 5; 325 MG/1; MG/1
1 TABLET ORAL EVERY 4 HOURS
Refills: 0 | Status: DISCONTINUED | OUTPATIENT
Start: 2021-07-02 | End: 2021-07-03

## 2021-07-02 RX ORDER — INSULIN LISPRO 100/ML
VIAL (ML) SUBCUTANEOUS
Refills: 0 | Status: DISCONTINUED | OUTPATIENT
Start: 2021-07-02 | End: 2021-07-03

## 2021-07-02 RX ORDER — CEFAZOLIN SODIUM 1 G
1000 VIAL (EA) INJECTION EVERY 8 HOURS
Refills: 0 | Status: COMPLETED | OUTPATIENT
Start: 2021-07-02 | End: 2021-07-03

## 2021-07-02 RX ORDER — GLUCAGON INJECTION, SOLUTION 0.5 MG/.1ML
1 INJECTION, SOLUTION SUBCUTANEOUS ONCE
Refills: 0 | Status: DISCONTINUED | OUTPATIENT
Start: 2021-07-02 | End: 2021-07-03

## 2021-07-02 RX ADMIN — Medication 2: at 15:21

## 2021-07-02 RX ADMIN — OXYCODONE AND ACETAMINOPHEN 2 TABLET(S): 5; 325 TABLET ORAL at 23:10

## 2021-07-02 RX ADMIN — Medication 100 MILLIGRAM(S): at 19:04

## 2021-07-02 RX ADMIN — Medication 6: at 23:03

## 2021-07-02 RX ADMIN — INSULIN GLARGINE 14 UNIT(S): 100 INJECTION, SOLUTION SUBCUTANEOUS at 23:02

## 2021-07-02 RX ADMIN — HEPARIN SODIUM 5000 UNIT(S): 5000 INJECTION INTRAVENOUS; SUBCUTANEOUS at 23:00

## 2021-07-02 NOTE — CONSULT NOTE ADULT - ATTENDING COMMENTS
Pt seen on rounds this afternoon s/p nephrolithotomy.  36-yo man with a 2-yr history of type I DM, maintained on regimen of 14 units of Tresiba qhs plus premeal Alfrezza (inhaled insulin) 8-12 units.  Has been monitoring fingersticks fairly infrequently, though had noted readings in the 200 range during the past few weeks, and surgery had actually been cancelled last week when he arrived with a fingerstick of approximately 390.    Took his usual 14 units of Tresiba last night, says that his fingerstick was near 120 this morning, but had risen to over 200 by the time he arrived at the hospital.  He was 207 post-op.  No current A1c level is available.  --Will give 14 units Lantus tonight  --Will "convert" the Alfrezza to SQ lispro while he is in the hospital, with a standing premeal dose of 8 units.  --Moderate dose sliding scale coverage  --Explained that the rise in his blood sugar this morning was at least partly due to "socorro phenomenon"  --Discussed the need for him to increase his monitoring, and ideally to obtain a Dexcom CGM  --Also explained the difficulty in controlling a pronounced socorro phenomenon with Lantus or Tresiba, and that it is often a reason for a transition to pump therapy.  (He needs considerably more teaching regarding the technology which is now available to help with glycemic control).

## 2021-07-02 NOTE — PROGRESS NOTE ADULT - SUBJECTIVE AND OBJECTIVE BOX
UROLOGY POST OP NOTE (PAGER # [phone])    PROCEDURE: L PCNL    T(C): 36.7 (07-02-21 @ 15:02), Max: 36.7 (07-02-21 @ 15:02)  HR: 71 (07-02-21 @ 15:02) (65 - 76)  BP: 119/74 (07-02-21 @ 15:02) (114/76 - 135/74)  RR: 17 (07-02-21 @ 15:02) (14 - 22)  SpO2: 98% (07-02-21 @ 15:02) (98% - 100%)  Wt(kg): --    Subjective: pain controlled. Denies CP, SOB, N, V, fevers, chills  ON PE: awake and alert    Abdomen: nontender, nondistended.     : no suprapubic/CVAT. FC and PCN draining fruit punch urine                          13.4   6.37  )-----------( 159      ( 02 Jul 2021 13:01 )             37.9     07-02    136  |  102  |  16  ----------------------------<  245<H>  4.6   |  26  |  1.23    Ca    8.8      02 Jul 2021 13:01  Phos  3.6     07-02  Mg     2.0     07-02        A/P:

## 2021-07-02 NOTE — PROGRESS NOTE ADULT - ASSESSMENT
36M hx DM type I and L renal stone s/p L PCNL 7/2        Plan:  -monitor I's and O's  -SCD's  -Incentive [place]  -OOB  -am labs  -endocrine consult

## 2021-07-02 NOTE — CONSULT NOTE ADULT - SUBJECTIVE AND OBJECTIVE BOX
**Incomplete Note**    HPI: 36yM with h/o kidney stones and DM1, presents to ED c/o worsening L flank pain and hematuria    Age at Dx:  How dx:  Hx and duration of insulin:  Current Therapy:  Hx of hypoglycemia  Hx of DKA/HHS?    Home FSG:  Fasting  Lunch  Dinner  Bed    Hx of other regimens  Complications:  Outpatient Endo:    PMH & Surgical Hx:N20.0    T1DM (type 1 diabetes mellitus)    HLD (hyperlipidemia)    ADHD (attention deficit hyperactivity disorder)    No pertinent past medical history    T1DM (type 1 diabetes mellitus)    Renal stone    Renal stone    Nephrolithotomy, percutaneous, with C-arm fluoroscopic guidance    No significant past surgical history    No significant past surgical history        FH:  DM:  Thyroid:  Autoimmune:  Other:    SH:  Smoking  Etoh:  Recreational Drugs:  Social Life:    Current Meds:  ceFAZolin   IVPB 1000 milliGRAM(s) IV Intermittent every 8 hours  dextrose 40% Gel 15 Gram(s) Oral once  dextrose 5%. 1000 milliLiter(s) IV Continuous <Continuous>  dextrose 5%. 1000 milliLiter(s) IV Continuous <Continuous>  dextrose 50% Injectable 25 Gram(s) IV Push once  dextrose 50% Injectable 12.5 Gram(s) IV Push once  dextrose 50% Injectable 25 Gram(s) IV Push once  glucagon  Injectable 1 milliGRAM(s) IntraMuscular once  heparin   Injectable 5000 Unit(s) SubCutaneous every 8 hours  insulin lispro (ADMELOG) corrective regimen sliding scale   SubCutaneous Before meals and at bedtime  ondansetron Injectable 4 milliGRAM(s) IV Push every 6 hours PRN  oxycodone    5 mG/acetaminophen 325 mG 1 Tablet(s) Oral every 4 hours PRN  oxycodone    5 mG/acetaminophen 325 mG 2 Tablet(s) Oral every 6 hours PRN  sodium chloride 0.9%. 1000 milliLiter(s) IV Continuous <Continuous>      Allergies:  No Known Allergies      ROS:  Denies the following except as indicated.    General: weight loss/weight gain, decreased appetite, fatigue  Eyes: Blurry vision, double vision, visual changes  ENT: Throat pain, changes in voice,   CV: palpitations, SOB, CP, cough  GI: NVD, difficulty swallowing, abdominal pain  : polyuria, dysuria  Endo: abnormal menses, temperature intolerance, decreased libido  MSK: weakness, joint pain  Skin: rash, dryness, diaphoresis  Heme: Easy bruising,bleeding  Neuro: HA, dizziness, lightheadedness, numbness tingling  Psych: Anxiety, Depression    Vital Signs Last 24 Hrs  T(C): 36.3 (02 Jul 2021 13:31), Max: 36.3 (02 Jul 2021 12:31)  T(F): 97.4 (02 Jul 2021 13:31), Max: 97.4 (02 Jul 2021 13:31)  HR: 68 (02 Jul 2021 13:31) (65 - 76)  BP: 114/76 (02 Jul 2021 13:31) (114/76 - 135/74)  BP(mean): 90 (02 Jul 2021 13:31) (90 - 99)  RR: 14 (02 Jul 2021 13:31) (14 - 22)  SpO2: 100% (02 Jul 2021 13:31) (100% - 100%)  Height (cm): 182.9 (07-02 @ 07:12)  Weight (kg): 79 (07-02 @ 07:12)  BMI (kg/m2): 23.6 (07-02 @ 07:12)      Constitutional: wn/wd in NAD.   HEENT: NCAT, MMM, OP clear, EOMI, , no proptosis or lid retraction  Neck: no thyromegaly or palpable thyroid nodules   Respiratory: lungs CTAB.  Cardiovascular: regular rhythm, normal S1 and S2, no audible murmurs, no peripheral edema  GI: soft, NT/ND, no masses/HSM appreciated.  Neurology: no tremors, DTR 2+  Skin: no visible rashes/lesions  Psychiatric: AAO x 3, normal affect/mood.  Ext: radial pulses intact, DP pulses intact, extremities warm, no cyanosis, clubbing or edema.       LABS:                        13.4   6.37  )-----------( 159      ( 02 Jul 2021 13:01 )             37.9     07-02    136  |  102  |  16  ----------------------------<  245<H>  4.6   |  26  |  1.23    Ca    8.8      02 Jul 2021 13:01  Phos  3.6     07-02  Mg     2.0     07-02                RADIOLOGY & ADDITIONAL STUDIES:  CAPILLARY BLOOD GLUCOSE      POCT Blood Glucose.: 260 mg/dL (02 Jul 2021 12:37)  POCT Blood Glucose.: 276 mg/dL (02 Jul 2021 10:50)  POCT Blood Glucose.: 268 mg/dL (02 Jul 2021 09:32)  POCT Blood Glucose.: 229 mg/dL (02 Jul 2021 07:14)        A/P:36y Male    1.  DM  Please obtain a1c and lipid panel  Please continue lantus       units at night / morning.  Please continue lispro      units before each meal.  Please continue lispro moderate / low dose sliding scale four times daily with meals and at bedtime    Pt's fingerstick glucose goal is     Will continue to monitor     For discharge, pt can continue    Pt can follow up at discharge with [facility] by calling  to make an appointment.   Will discuss case with     and update primary team **Incomplete Note**    HPI: 36yM with h/o kidney stones and DM1 (Diagnosed @ age 34, a1c unknown), presents to the ED c/o worsening L flank pain and hematuria, endocrinology consulted for DM1 insulin management. He was found to have UPJ nephrolithiasis requiring left percutaneous nephrolithotomy; however, the procedure was canceled as preop FSG was 390. Of note, the patient sees Dr. Barron at [facility] (last apt Nov. 2020) and has been taking Afrezza 12 units before meals and Tresiba 14 units at bedtime. He has not been tracking his blood sugars frequently with accucheck. He denies sxs of hypoglycemia including fatigue, confusion, diaphoresis, palpitations, LOC, or seizures.     Age at Dx: 34  How dx: symptomatic polyuria, polyphagia, and weighloss  Hx and duration of insulin: 1.5 years   Current Therapy: Afrezza 12 units qac, Tresiba 14 qhs  Hx of hypoglycemia: no  Hx of DKA/HHS? no    Home FSG: not tracking      Complications: none  Outpatient Endo: Dr. Barron     PMH & Surgical Hx:N20.0    T1DM (type 1 diabetes mellitus)    HLD (hyperlipidemia)    ADHD (attention deficit hyperactivity disorder)    No pertinent past medical history    T1DM (type 1 diabetes mellitus)    Renal stone    Renal stone    Nephrolithotomy, percutaneous, with C-arm fluoroscopic guidance    No significant past surgical history    No significant past surgical history        FH: no significant fhx  DM: none  Thyroid: none  Autoimmune: none  Other:    SH:   Smoking: No hx of smoking  Etoh: social drinking 1-2 drinks/ week   Recreational Drugs: none    Current Meds:  ceFAZolin   IVPB 1000 milliGRAM(s) IV Intermittent every 8 hours  dextrose 40% Gel 15 Gram(s) Oral once  dextrose 5%. 1000 milliLiter(s) IV Continuous <Continuous>  dextrose 5%. 1000 milliLiter(s) IV Continuous <Continuous>  dextrose 50% Injectable 25 Gram(s) IV Push once  dextrose 50% Injectable 12.5 Gram(s) IV Push once  dextrose 50% Injectable 25 Gram(s) IV Push once  glucagon  Injectable 1 milliGRAM(s) IntraMuscular once  heparin   Injectable 5000 Unit(s) SubCutaneous every 8 hours  insulin lispro (ADMELOG) corrective regimen sliding scale   SubCutaneous Before meals and at bedtime  ondansetron Injectable 4 milliGRAM(s) IV Push every 6 hours PRN  oxycodone    5 mG/acetaminophen 325 mG 1 Tablet(s) Oral every 4 hours PRN  oxycodone    5 mG/acetaminophen 325 mG 2 Tablet(s) Oral every 6 hours PRN  sodium chloride 0.9%. 1000 milliLiter(s) IV Continuous <Continuous>      Allergies:  No Known Allergies      ROS:  Denies the following except as indicated.    General: weight loss/weight gain, decreased appetite, fatigue  Eyes: Blurry vision, double vision, visual changes  ENT: Throat pain, changes in voice,   CV: palpitations, SOB, CP, cough  GI: NVD, difficulty swallowing, abdominal pain  : polyuria, dysuria  Endo: abnormal menses, temperature intolerance, decreased libido  MSK: weakness, joint pain  Skin: rash, dryness, diaphoresis  Heme: Easy bruising,bleeding  Neuro: HA, dizziness, lightheadedness, numbness tingling  Psych: Anxiety, Depression    Vital Signs Last 24 Hrs  T(C): 36.3 (02 Jul 2021 13:31), Max: 36.3 (02 Jul 2021 12:31)  T(F): 97.4 (02 Jul 2021 13:31), Max: 97.4 (02 Jul 2021 13:31)  HR: 68 (02 Jul 2021 13:31) (65 - 76)  BP: 114/76 (02 Jul 2021 13:31) (114/76 - 135/74)  BP(mean): 90 (02 Jul 2021 13:31) (90 - 99)  RR: 14 (02 Jul 2021 13:31) (14 - 22)  SpO2: 100% (02 Jul 2021 13:31) (100% - 100%)  Height (cm): 182.9 (07-02 @ 07:12)  Weight (kg): 79 (07-02 @ 07:12)  BMI (kg/m2): 23.6 (07-02 @ 07:12)      Constitutional: wn/wd in NAD.   HEENT: NCAT, MMM, OP clear, EOMI, , no proptosis or lid retraction  Neck: no thyromegaly or palpable thyroid nodules   Respiratory: lungs CTAB.  Cardiovascular: regular rhythm, normal S1 and S2, no audible murmurs, no peripheral edema  GI: soft, NT/ND, no masses/HSM appreciated  Neurology: no tremors, DTR 2+  Skin: no visible rashes/lesions  Psychiatric: AAO x 3, normal affect/mood.  Ext: radial pulses intact, DP pulses intact, extremities warm, no cyanosis, clubbing or edema.       LABS:                        13.4   6.37  )-----------( 159      ( 02 Jul 2021 13:01 )             37.9     07-02    136  |  102  |  16  ----------------------------<  245<H>  4.6   |  26  |  1.23    Ca    8.8      02 Jul 2021 13:01  Phos  3.6     07-02  Mg     2.0     07-02                RADIOLOGY & ADDITIONAL STUDIES:  CAPILLARY BLOOD GLUCOSE      POCT Blood Glucose.: 260 mg/dL (02 Jul 2021 12:37)  POCT Blood Glucose.: 276 mg/dL (02 Jul 2021 10:50)  POCT Blood Glucose.: 268 mg/dL (02 Jul 2021 09:32)  POCT Blood Glucose.: 229 mg/dL (02 Jul 2021 07:14)        A/P: 36yM with h/o kidney stones and DM1 (Diagnosed @ age 34, a1c unknown), presents to the ED c/o worsening L flank pain and hematuria, endocrinology consulted for DM1 insulin management. He was found to have elevated BGS in the high 200-300s now on sliding scale since 07/02/2021 .His preop assessment for left perc nephrolithotomy was canceled d/t BGS of 390.     1.  DM  Please obtain a1c and lipid panel  Please begin Lantus  12    units at night.  Please continue lispro 4 units before each meal.  Please continue lispro moderate / low dose sliding scale four times daily with meals and at bedtime    Pt's fingerstick glucose goal is 140-180mg/dL    Will continue to monitor     **Recs are pending discussion w/Dr. Figueroa    For discharge, pt can continue to follow with his endocrinologist Dr. Barron  **Incomplete Note**    HPI: 36yM with h/o kidney stones and DM1 (Diagnosed @ age 34, a1c unknown), presents to the ED c/o worsening L flank pain and hematuria, endocrinology consulted for DM1 insulin management. He was found to have UPJ nephrolithiasis requiring left percutaneous nephrolithotomy; however, on previous admission, the procedure was canceled as preop FSG was 390. Of note, the patient sees Dr. Barron at [facility] (last apt Nov. 2020) and has been taking Afrezza 12 units before meals and Tresiba 14 units at bedtime. He has not been tracking his blood sugars frequently with accucheck. He denies sxs of hypoglycemia including fatigue, confusion, diaphoresis, palpitations, LOC, or seizures.     Age at Dx: 34  How dx: symptomatic polyuria, polyphagia, and weighloss  Hx and duration of insulin: 1.5 years   Current Therapy: Afrezza 12 units qac, Tresiba 14 qhs  Hx of hypoglycemia: no  Hx of DKA/HHS? no    Home FSG: not tracking      Complications: none  Outpatient Endo: Dr. Barron     PMH & Surgical Hx:N20.0    T1DM (type 1 diabetes mellitus)    HLD (hyperlipidemia)    ADHD (attention deficit hyperactivity disorder)    No pertinent past medical history    T1DM (type 1 diabetes mellitus)    Renal stone    Renal stone    Nephrolithotomy, percutaneous, with C-arm fluoroscopic guidance    No significant past surgical history    No significant past surgical history        FH: no significant fhx  DM: none  Thyroid: none  Autoimmune: none  Other:    SH:   Smoking: No hx of smoking  Etoh: social drinking 1-2 drinks/ week   Recreational Drugs: none    Current Meds:  ceFAZolin   IVPB 1000 milliGRAM(s) IV Intermittent every 8 hours  dextrose 40% Gel 15 Gram(s) Oral once  dextrose 5%. 1000 milliLiter(s) IV Continuous <Continuous>  dextrose 5%. 1000 milliLiter(s) IV Continuous <Continuous>  dextrose 50% Injectable 25 Gram(s) IV Push once  dextrose 50% Injectable 12.5 Gram(s) IV Push once  dextrose 50% Injectable 25 Gram(s) IV Push once  glucagon  Injectable 1 milliGRAM(s) IntraMuscular once  heparin   Injectable 5000 Unit(s) SubCutaneous every 8 hours  insulin lispro (ADMELOG) corrective regimen sliding scale   SubCutaneous Before meals and at bedtime  ondansetron Injectable 4 milliGRAM(s) IV Push every 6 hours PRN  oxycodone    5 mG/acetaminophen 325 mG 1 Tablet(s) Oral every 4 hours PRN  oxycodone    5 mG/acetaminophen 325 mG 2 Tablet(s) Oral every 6 hours PRN  sodium chloride 0.9%. 1000 milliLiter(s) IV Continuous <Continuous>      Allergies:  No Known Allergies      ROS:  Denies the following except as indicated.    General: weight loss/weight gain, decreased appetite, fatigue  Eyes: Blurry vision, double vision, visual changes  ENT: Throat pain, changes in voice,   CV: palpitations, SOB, CP, cough  GI: NVD, difficulty swallowing, abdominal pain  : polyuria, dysuria  Endo: abnormal menses, temperature intolerance, decreased libido  MSK: weakness, joint pain  Skin: rash, dryness, diaphoresis  Heme: Easy bruising,bleeding  Neuro: HA, dizziness, lightheadedness, numbness tingling  Psych: Anxiety, Depression    Vital Signs Last 24 Hrs  T(C): 36.3 (02 Jul 2021 13:31), Max: 36.3 (02 Jul 2021 12:31)  T(F): 97.4 (02 Jul 2021 13:31), Max: 97.4 (02 Jul 2021 13:31)  HR: 68 (02 Jul 2021 13:31) (65 - 76)  BP: 114/76 (02 Jul 2021 13:31) (114/76 - 135/74)  BP(mean): 90 (02 Jul 2021 13:31) (90 - 99)  RR: 14 (02 Jul 2021 13:31) (14 - 22)  SpO2: 100% (02 Jul 2021 13:31) (100% - 100%)  Height (cm): 182.9 (07-02 @ 07:12)  Weight (kg): 79 (07-02 @ 07:12)  BMI (kg/m2): 23.6 (07-02 @ 07:12)      Constitutional: wn/wd in NAD.   HEENT: NCAT, MMM, OP clear, EOMI, , no proptosis or lid retraction  Neck: no thyromegaly or palpable thyroid nodules   Respiratory: lungs CTAB.  Cardiovascular: regular rhythm, normal S1 and S2, no audible murmurs, no peripheral edema  GI: soft, NT/ND, no masses/HSM appreciated  Neurology: no tremors, DTR 2+  Skin: no visible rashes/lesions  Psychiatric: AAO x 3, normal affect/mood.  Ext: radial pulses intact, DP pulses intact, extremities warm, no cyanosis, clubbing or edema.       LABS:                        13.4   6.37  )-----------( 159      ( 02 Jul 2021 13:01 )             37.9     07-02    136  |  102  |  16  ----------------------------<  245<H>  4.6   |  26  |  1.23    Ca    8.8      02 Jul 2021 13:01  Phos  3.6     07-02  Mg     2.0     07-02                RADIOLOGY & ADDITIONAL STUDIES:  CAPILLARY BLOOD GLUCOSE      POCT Blood Glucose.: 260 mg/dL (02 Jul 2021 12:37)  POCT Blood Glucose.: 276 mg/dL (02 Jul 2021 10:50)  POCT Blood Glucose.: 268 mg/dL (02 Jul 2021 09:32)  POCT Blood Glucose.: 229 mg/dL (02 Jul 2021 07:14)        A/P: 36yM with h/o kidney stones and DM1 (Diagnosed @ age 34, a1c unknown), presents to the ED c/o worsening L flank pain and hematuria, endocrinology consulted for DM1 insulin management. He was found to have elevated BGS in the high 200-300s now on sliding scale since 07/02/2021 .His preop assessment for left perc nephrolithotomy was canceled d/t BGS of 390.     1.  DM  Please obtain a1c and lipid panel  Please begin Lantus  14    units at night.  Please continue lispro 8 units before each meal.  Please continue lispro low dose sliding scale four times daily with meals and at bedtime    Pt's fingerstick glucose goal is 140-180mg/dL    Will continue to monitor     Plan discussed w/Dr. Figueroa     For discharge, pt can continue to follow with his endocrinologist Dr. Barron  **Incomplete Note**    HPI: 36yM with h/o kidney stones and DM1 (Diagnosed @ age 34, a1c unknown), presents to the ED c/o worsening L flank pain and hematuria, endocrinology consulted for DM1 insulin management. He was found to have UPJ nephrolithiasis requiring left percutaneous nephrolithotomy. However, on previous admission, the procedure was canceled as preop FSG was 390. Of note, the patient sees Dr. Barron at [facility] (last apt Nov. 2020) and has been taking Afrezza 12 units before meals and Tresiba 14 units at bedtime. However, for the last 2 days hes been taking 10 units lispro qac w/Tresiba 14 units at bedside. His morning sugar today while npo was in the 120s. He has not been tracking his blood sugars frequently with accucheck. He denies sxs of hypoglycemia including fatigue, confusion, diaphoresis, palpitations, LOC, or seizures.     Age at Dx: 34  How dx: symptomatic polyuria, polyphagia, and weighloss  Hx and duration of insulin: 1.5 years   Current Therapy: Afrezza 12 units qac, Tresiba 14 qhs  Hx of hypoglycemia: no  Hx of DKA/HHS? no    Home FSG: not tracking      Complications: none  Outpatient Endo: Dr. Barron     PMH & Surgical Hx:N20.0    T1DM (type 1 diabetes mellitus)    HLD (hyperlipidemia)    ADHD (attention deficit hyperactivity disorder)    No pertinent past medical history    T1DM (type 1 diabetes mellitus)    Renal stone    Renal stone    Nephrolithotomy, percutaneous, with C-arm fluoroscopic guidance    No significant past surgical history    No significant past surgical history        FH: no significant fhx  DM: none  Thyroid: none  Autoimmune: none  Other:    SH:   Smoking: No hx of smoking  Etoh: social drinking 1-2 drinks/ week   Recreational Drugs: none    Current Meds:  ceFAZolin   IVPB 1000 milliGRAM(s) IV Intermittent every 8 hours  dextrose 40% Gel 15 Gram(s) Oral once  dextrose 5%. 1000 milliLiter(s) IV Continuous <Continuous>  dextrose 5%. 1000 milliLiter(s) IV Continuous <Continuous>  dextrose 50% Injectable 25 Gram(s) IV Push once  dextrose 50% Injectable 12.5 Gram(s) IV Push once  dextrose 50% Injectable 25 Gram(s) IV Push once  glucagon  Injectable 1 milliGRAM(s) IntraMuscular once  heparin   Injectable 5000 Unit(s) SubCutaneous every 8 hours  insulin lispro (ADMELOG) corrective regimen sliding scale   SubCutaneous Before meals and at bedtime  ondansetron Injectable 4 milliGRAM(s) IV Push every 6 hours PRN  oxycodone    5 mG/acetaminophen 325 mG 1 Tablet(s) Oral every 4 hours PRN  oxycodone    5 mG/acetaminophen 325 mG 2 Tablet(s) Oral every 6 hours PRN  sodium chloride 0.9%. 1000 milliLiter(s) IV Continuous <Continuous>      Allergies:  No Known Allergies      ROS:  Denies the following except as indicated.    General: weight loss/weight gain, decreased appetite, fatigue  Eyes: Blurry vision, double vision, visual changes  ENT: Throat pain, changes in voice,   CV: palpitations, SOB, CP, cough  GI: NVD, difficulty swallowing, abdominal pain  : polyuria, dysuria  Endo: abnormal menses, temperature intolerance, decreased libido  MSK: weakness, joint pain  Skin: rash, dryness, diaphoresis  Heme: Easy bruising,bleeding  Neuro: HA, dizziness, lightheadedness, numbness tingling  Psych: Anxiety, Depression    Vital Signs Last 24 Hrs  T(C): 36.3 (02 Jul 2021 13:31), Max: 36.3 (02 Jul 2021 12:31)  T(F): 97.4 (02 Jul 2021 13:31), Max: 97.4 (02 Jul 2021 13:31)  HR: 68 (02 Jul 2021 13:31) (65 - 76)  BP: 114/76 (02 Jul 2021 13:31) (114/76 - 135/74)  BP(mean): 90 (02 Jul 2021 13:31) (90 - 99)  RR: 14 (02 Jul 2021 13:31) (14 - 22)  SpO2: 100% (02 Jul 2021 13:31) (100% - 100%)  Height (cm): 182.9 (07-02 @ 07:12)  Weight (kg): 79 (07-02 @ 07:12)  BMI (kg/m2): 23.6 (07-02 @ 07:12)      Constitutional: wn/wd in NAD.   HEENT: NCAT, MMM, OP clear, EOMI, , no proptosis or lid retraction  Neck: no thyromegaly or palpable thyroid nodules   Respiratory: lungs CTAB.  Cardiovascular: regular rhythm, normal S1 and S2, no audible murmurs, no peripheral edema  GI: soft, NT/ND, no masses/HSM appreciated  Neurology: no tremors, DTR 2+  Skin: no visible rashes/lesions  Psychiatric: AAO x 3, normal affect/mood.  Ext: radial pulses intact, DP pulses intact, extremities warm, no cyanosis, clubbing or edema.       LABS:                        13.4   6.37  )-----------( 159      ( 02 Jul 2021 13:01 )             37.9     07-02    136  |  102  |  16  ----------------------------<  245<H>  4.6   |  26  |  1.23    Ca    8.8      02 Jul 2021 13:01  Phos  3.6     07-02  Mg     2.0     07-02                RADIOLOGY & ADDITIONAL STUDIES:  CAPILLARY BLOOD GLUCOSE      POCT Blood Glucose.: 260 mg/dL (02 Jul 2021 12:37)  POCT Blood Glucose.: 276 mg/dL (02 Jul 2021 10:50)  POCT Blood Glucose.: 268 mg/dL (02 Jul 2021 09:32)  POCT Blood Glucose.: 229 mg/dL (02 Jul 2021 07:14)        A/P: 36yM with h/o kidney stones and DM1 (Diagnosed @ age 34, a1c unknown), presents to the ED c/o worsening L flank pain and hematuria, endocrinology consulted for DM1 insulin management. He was found to have elevated BGS in the high 200-300s now on sliding scale since 07/02/2021 .His preop assessment for left perc nephrolithotomy was canceled d/t BGS of 390.     1.  DM  Please obtain a1c and lipid panel  Please begin Lantus  14    units at night.  Please continue lispro 8 units before each meal.  Please continue lispro low dose sliding scale four times daily with meals and at bedtime    Pt's fingerstick glucose goal is 140-180mg/dL    Will continue to monitor     Plan discussed w/Dr. Figueroa     For discharge, pt can continue to follow with his endocrinologist Dr. Barron  HPI: 36yM with h/o kidney stones and DM1 (Diagnosed @ age 34, a1c unknown), presents to the ED c/o worsening L flank pain and hematuria, endocrinology consulted for DM1 insulin management. He was found to have UPJ nephrolithiasis requiring left percutaneous nephrolithotomy. However, on previous admission, the procedure was canceled as preop FSG was 390. Of note, the patient sees Dr. Barron at [address] [place] [address] [facility] (last apt Nov. 2020) and has been taking Afrezza 12 units before meals and Tresiba 14 units at bedtime. However, for the last 2 days hes been taking 10 units lispro qac w/Tresiba 14 units at bedside. His morning sugar today while npo was in the 120s. He has not been tracking his blood sugars frequently with accucheck. He denies sxs of hypoglycemia including fatigue, confusion, diaphoresis, palpitations, LOC, or seizures.     Age at Dx: 34  How dx: symptomatic polyuria, polyphagia, and weighloss  Hx and duration of insulin: 1.5 years   Current Therapy: Afrezza 12 units qac, Tresiba 14 qhs  Hx of hypoglycemia: no  Hx of DKA/HHS? no    Home FSG: not tracking      Complications: none  Outpatient Endo: Dr. Barron     PMH & Surgical Hx:N20.0    T1DM (type 1 diabetes mellitus)    HLD (hyperlipidemia)    ADHD (attention deficit hyperactivity disorder)    No pertinent past medical history    T1DM (type 1 diabetes mellitus)    Renal stone    Renal stone    Nephrolithotomy, percutaneous, with C-arm fluoroscopic guidance    No significant past surgical history    No significant past surgical history        FH: no significant fhx  DM: none  Thyroid: none  Autoimmune: none  Other:    SH:   Smoking: No hx of smoking  Etoh: social drinking 1-2 drinks/ week   Recreational Drugs: none    Current Meds:  ceFAZolin   IVPB 1000 milliGRAM(s) IV Intermittent every 8 hours  dextrose 40% Gel 15 Gram(s) Oral once  dextrose 5%. 1000 milliLiter(s) IV Continuous <Continuous>  dextrose 5%. 1000 milliLiter(s) IV Continuous <Continuous>  dextrose 50% Injectable 25 Gram(s) IV Push once  dextrose 50% Injectable 12.5 Gram(s) IV Push once  dextrose 50% Injectable 25 Gram(s) IV Push once  glucagon  Injectable 1 milliGRAM(s) IntraMuscular once  heparin   Injectable 5000 Unit(s) SubCutaneous every 8 hours  insulin lispro (ADMELOG) corrective regimen sliding scale   SubCutaneous Before meals and at bedtime  ondansetron Injectable 4 milliGRAM(s) IV Push every 6 hours PRN  oxycodone    5 mG/acetaminophen 325 mG 1 Tablet(s) Oral every 4 hours PRN  oxycodone    5 mG/acetaminophen 325 mG 2 Tablet(s) Oral every 6 hours PRN  sodium chloride 0.9%. 1000 milliLiter(s) IV Continuous <Continuous>      Allergies:  No Known Allergies      ROS:  Denies the following except as indicated.    General: weight loss/weight gain, decreased appetite, fatigue  Eyes: Blurry vision, double vision, visual changes  ENT: Throat pain, changes in voice,   CV: palpitations, SOB, CP, cough  GI: NVD, difficulty swallowing, abdominal pain  : polyuria, dysuria  Endo: abnormal menses, temperature intolerance, decreased libido  MSK: weakness, joint pain  Skin: rash, dryness, diaphoresis  Heme: Easy bruising,bleeding  Neuro: HA, dizziness, lightheadedness, numbness tingling  Psych: Anxiety, Depression    Vital Signs Last 24 Hrs  T(C): 36.3 (02 Jul 2021 13:31), Max: 36.3 (02 Jul 2021 12:31)  T(F): 97.4 (02 Jul 2021 13:31), Max: 97.4 (02 Jul 2021 13:31)  HR: 68 (02 Jul 2021 13:31) (65 - 76)  BP: 114/76 (02 Jul 2021 13:31) (114/76 - 135/74)  BP(mean): 90 (02 Jul 2021 13:31) (90 - 99)  RR: 14 (02 Jul 2021 13:31) (14 - 22)  SpO2: 100% (02 Jul 2021 13:31) (100% - 100%)  Height (cm): 182.9 (07-02 @ 07:12)  Weight (kg): 79 (07-02 @ 07:12)  BMI (kg/m2): 23.6 (07-02 @ 07:12)      Constitutional: wn/wd in NAD.   HEENT: NCAT, MMM, OP clear, EOMI, , no proptosis or lid retraction  Neck: no thyromegaly or palpable thyroid nodules   Respiratory: lungs CTAB.  Cardiovascular: regular rhythm, normal S1 and S2, no audible murmurs, no peripheral edema  GI: soft, NT/ND, no masses/HSM appreciated  Neurology: no tremors, DTR 2+  Skin: no visible rashes/lesions  Psychiatric: AAO x 3, normal affect/mood.  Ext: radial pulses intact, DP pulses intact, extremities warm, no cyanosis, clubbing or edema.       LABS:                        13.4   6.37  )-----------( 159      ( 02 Jul 2021 13:01 )             37.9     07-02    136  |  102  |  16  ----------------------------<  245<H>  4.6   |  26  |  1.23    Ca    8.8      02 Jul 2021 13:01  Phos  3.6     07-02  Mg     2.0     07-02                RADIOLOGY & ADDITIONAL STUDIES:  CAPILLARY BLOOD GLUCOSE      POCT Blood Glucose.: 260 mg/dL (02 Jul 2021 12:37)  POCT Blood Glucose.: 276 mg/dL (02 Jul 2021 10:50)  POCT Blood Glucose.: 268 mg/dL (02 Jul 2021 09:32)  POCT Blood Glucose.: 229 mg/dL (02 Jul 2021 07:14)        A/P: 36yM with h/o kidney stones and DM1 (Diagnosed @ age 34, a1c unknown), presents to the ED c/o worsening L flank pain and hematuria, endocrinology consulted for DM1 insulin management. He was found to have elevated BGS in the high 200-300s now on sliding scale since 07/02/2021 .His preop assessment for left perc nephrolithotomy was canceled d/t BGS of 390.     1.  DM  Please obtain a1c and lipid panel  Please begin Lantus  14    units at night.  Please continue lispro 8 units before each meal.  Please continue lispro low dose sliding scale four times daily with meals and at bedtime    Pt's fingerstick glucose goal is 140-180mg/dL    Will continue to monitor     Plan discussed w/Dr. Figueroa     For discharge, pt can continue to follow with his endocrinologist Dr. Barron

## 2021-07-02 NOTE — BRIEF OPERATIVE NOTE - NSICDXBRIEFPROCEDURE_GEN_ALL_CORE_FT
PROCEDURES:  Nephrolithotomy, percutaneous, with C-arm fluoroscopic guidance 02-Jul-2021 12:33:58  Luis Eduardo Wu

## 2021-07-03 ENCOUNTER — TRANSCRIPTION ENCOUNTER (OUTPATIENT)
Age: 36
End: 2021-07-03

## 2021-07-03 VITALS
TEMPERATURE: 98 F | HEART RATE: 81 BPM | SYSTOLIC BLOOD PRESSURE: 109 MMHG | RESPIRATION RATE: 17 BRPM | OXYGEN SATURATION: 98 % | DIASTOLIC BLOOD PRESSURE: 69 MMHG

## 2021-07-03 LAB
A1C WITH ESTIMATED AVERAGE GLUCOSE RESULT: 11.9 % — HIGH (ref 4–5.6)
ANION GAP SERPL CALC-SCNC: 9 MMOL/L — SIGNIFICANT CHANGE UP (ref 5–17)
BASOPHILS # BLD AUTO: 0.04 K/UL — SIGNIFICANT CHANGE UP (ref 0–0.2)
BASOPHILS NFR BLD AUTO: 0.7 % — SIGNIFICANT CHANGE UP (ref 0–2)
BUN SERPL-MCNC: 17 MG/DL — SIGNIFICANT CHANGE UP (ref 7–23)
CALCIUM SERPL-MCNC: 8.7 MG/DL — SIGNIFICANT CHANGE UP (ref 8.4–10.5)
CHLORIDE SERPL-SCNC: 104 MMOL/L — SIGNIFICANT CHANGE UP (ref 96–108)
CHOLEST SERPL-MCNC: 148 MG/DL — SIGNIFICANT CHANGE UP
CO2 SERPL-SCNC: 24 MMOL/L — SIGNIFICANT CHANGE UP (ref 22–31)
COVID-19 SPIKE DOMAIN AB INTERP: POSITIVE
COVID-19 SPIKE DOMAIN ANTIBODY RESULT: 8.3 U/ML — HIGH
CREAT SERPL-MCNC: 0.95 MG/DL — SIGNIFICANT CHANGE UP (ref 0.5–1.3)
EOSINOPHIL # BLD AUTO: 0.15 K/UL — SIGNIFICANT CHANGE UP (ref 0–0.5)
EOSINOPHIL NFR BLD AUTO: 2.5 % — SIGNIFICANT CHANGE UP (ref 0–6)
ESTIMATED AVERAGE GLUCOSE: 295 MG/DL — HIGH (ref 68–114)
GLUCOSE BLDC GLUCOMTR-MCNC: 239 MG/DL — HIGH (ref 70–99)
GLUCOSE BLDC GLUCOMTR-MCNC: 339 MG/DL — HIGH (ref 70–99)
GLUCOSE BLDC GLUCOMTR-MCNC: 429 MG/DL — HIGH (ref 70–99)
GLUCOSE SERPL-MCNC: 221 MG/DL — HIGH (ref 70–99)
HCT VFR BLD CALC: 37.9 % — LOW (ref 39–50)
HDLC SERPL-MCNC: 28 MG/DL — LOW
HGB BLD-MCNC: 13.2 G/DL — SIGNIFICANT CHANGE UP (ref 13–17)
IMM GRANULOCYTES NFR BLD AUTO: 0.5 % — SIGNIFICANT CHANGE UP (ref 0–1.5)
LIPID PNL WITH DIRECT LDL SERPL: 99 MG/DL — SIGNIFICANT CHANGE UP
LYMPHOCYTES # BLD AUTO: 1.49 K/UL — SIGNIFICANT CHANGE UP (ref 1–3.3)
LYMPHOCYTES # BLD AUTO: 24.6 % — SIGNIFICANT CHANGE UP (ref 13–44)
MCHC RBC-ENTMCNC: 32.1 PG — SIGNIFICANT CHANGE UP (ref 27–34)
MCHC RBC-ENTMCNC: 34.8 GM/DL — SIGNIFICANT CHANGE UP (ref 32–36)
MCV RBC AUTO: 92.2 FL — SIGNIFICANT CHANGE UP (ref 80–100)
MONOCYTES # BLD AUTO: 0.7 K/UL — SIGNIFICANT CHANGE UP (ref 0–0.9)
MONOCYTES NFR BLD AUTO: 11.6 % — SIGNIFICANT CHANGE UP (ref 2–14)
NEUTROPHILS # BLD AUTO: 3.65 K/UL — SIGNIFICANT CHANGE UP (ref 1.8–7.4)
NEUTROPHILS NFR BLD AUTO: 60.1 % — SIGNIFICANT CHANGE UP (ref 43–77)
NON HDL CHOLESTEROL: 120 MG/DL — SIGNIFICANT CHANGE UP
NRBC # BLD: 0 /100 WBCS — SIGNIFICANT CHANGE UP (ref 0–0)
PLATELET # BLD AUTO: 165 K/UL — SIGNIFICANT CHANGE UP (ref 150–400)
POTASSIUM SERPL-MCNC: 4 MMOL/L — SIGNIFICANT CHANGE UP (ref 3.5–5.3)
POTASSIUM SERPL-SCNC: 4 MMOL/L — SIGNIFICANT CHANGE UP (ref 3.5–5.3)
RBC # BLD: 4.11 M/UL — LOW (ref 4.2–5.8)
RBC # FLD: 11.7 % — SIGNIFICANT CHANGE UP (ref 10.3–14.5)
SARS-COV-2 IGG+IGM SERPL QL IA: 8.3 U/ML — HIGH
SARS-COV-2 IGG+IGM SERPL QL IA: POSITIVE
SODIUM SERPL-SCNC: 137 MMOL/L — SIGNIFICANT CHANGE UP (ref 135–145)
TRIGL SERPL-MCNC: 105 MG/DL — SIGNIFICANT CHANGE UP
WBC # BLD: 6.06 K/UL — SIGNIFICANT CHANGE UP (ref 3.8–10.5)
WBC # FLD AUTO: 6.06 K/UL — SIGNIFICANT CHANGE UP (ref 3.8–10.5)

## 2021-07-03 PROCEDURE — 86769 SARS-COV-2 COVID-19 ANTIBODY: CPT

## 2021-07-03 PROCEDURE — C1894: CPT

## 2021-07-03 PROCEDURE — 87205 SMEAR GRAM STAIN: CPT

## 2021-07-03 PROCEDURE — 87070 CULTURE OTHR SPECIMN AEROBIC: CPT

## 2021-07-03 PROCEDURE — 85025 COMPLETE CBC W/AUTO DIFF WBC: CPT

## 2021-07-03 PROCEDURE — C1887: CPT

## 2021-07-03 PROCEDURE — C1758: CPT

## 2021-07-03 PROCEDURE — 76000 FLUOROSCOPY <1 HR PHYS/QHP: CPT

## 2021-07-03 PROCEDURE — 83036 HEMOGLOBIN GLYCOSYLATED A1C: CPT

## 2021-07-03 PROCEDURE — 84100 ASSAY OF PHOSPHORUS: CPT

## 2021-07-03 PROCEDURE — 80061 LIPID PANEL: CPT

## 2021-07-03 PROCEDURE — 80048 BASIC METABOLIC PNL TOTAL CA: CPT

## 2021-07-03 PROCEDURE — 88300 SURGICAL PATH GROSS: CPT

## 2021-07-03 PROCEDURE — 83735 ASSAY OF MAGNESIUM: CPT

## 2021-07-03 PROCEDURE — 99233 SBSQ HOSP IP/OBS HIGH 50: CPT | Mod: GC

## 2021-07-03 PROCEDURE — 85027 COMPLETE CBC AUTOMATED: CPT

## 2021-07-03 PROCEDURE — C2617: CPT

## 2021-07-03 PROCEDURE — C1769: CPT

## 2021-07-03 PROCEDURE — C1726: CPT

## 2021-07-03 PROCEDURE — 36415 COLL VENOUS BLD VENIPUNCTURE: CPT

## 2021-07-03 PROCEDURE — 87075 CULTR BACTERIA EXCEPT BLOOD: CPT

## 2021-07-03 PROCEDURE — 50437 DILAT XST TRC NEW ACCESS RCS: CPT

## 2021-07-03 PROCEDURE — 82962 GLUCOSE BLOOD TEST: CPT

## 2021-07-03 RX ORDER — DIAZEPAM 5 MG
1 TABLET ORAL
Qty: 9 | Refills: 0
Start: 2021-07-03 | End: 2021-07-05

## 2021-07-03 RX ORDER — TAMSULOSIN HYDROCHLORIDE 0.4 MG/1
1 CAPSULE ORAL
Qty: 7 | Refills: 0
Start: 2021-07-03 | End: 2021-07-09

## 2021-07-03 RX ORDER — CEFPODOXIME PROXETIL 100 MG
1 TABLET ORAL
Qty: 4 | Refills: 0
Start: 2021-07-03 | End: 2021-07-04

## 2021-07-03 RX ADMIN — OXYCODONE AND ACETAMINOPHEN 1 TABLET(S): 5; 325 TABLET ORAL at 12:00

## 2021-07-03 RX ADMIN — OXYCODONE AND ACETAMINOPHEN 1 TABLET(S): 5; 325 TABLET ORAL at 05:42

## 2021-07-03 RX ADMIN — Medication 8 UNIT(S): at 12:25

## 2021-07-03 RX ADMIN — Medication 4: at 12:25

## 2021-07-03 RX ADMIN — OXYCODONE AND ACETAMINOPHEN 1 TABLET(S): 5; 325 TABLET ORAL at 11:14

## 2021-07-03 RX ADMIN — Medication 100 MILLIGRAM(S): at 12:25

## 2021-07-03 RX ADMIN — Medication 100 MILLIGRAM(S): at 03:37

## 2021-07-03 RX ADMIN — HEPARIN SODIUM 5000 UNIT(S): 5000 INJECTION INTRAVENOUS; SUBCUTANEOUS at 05:41

## 2021-07-03 RX ADMIN — Medication 8 UNIT(S): at 06:56

## 2021-07-03 RX ADMIN — Medication 2: at 06:56

## 2021-07-03 RX ADMIN — OXYCODONE AND ACETAMINOPHEN 2 TABLET(S): 5; 325 TABLET ORAL at 00:00

## 2021-07-03 NOTE — DISCHARGE NOTE PROVIDER - CARE PROVIDER_API CALL
Cole Gay)  Urology  [address] [place] [address], [place] [address]  Phone: [phone]  Fax: [phone]  Follow Up Time:

## 2021-07-03 NOTE — PROGRESS NOTE ADULT - SUBJECTIVE AND OBJECTIVE BOX
INTERVAL HPI/OVERNIGHT EVENTS:    Patient is a 36y old  Male who presents with a chief complaint of Left Renal stone (2021 06:05)  Pt was seen and examined at the bedside.   Afebrile. No acute events overnight.   He reports pain at left flank.   A1c: 11.9%.   He ate omelet with wheat bread for breakfast   FSG & Insulin received:    Yesterday:  pre-dinner fs  2  units lispro SS, didnt receive pre meals insulin , ate diner.  bedtime fs  lantus 14  units +  6  units lispro SS  Today:  pre-breakfast fs  nutritional lispro 8  units+   2 units lispro SS  pre-lunch fs  nutritional lispro 8  units+  4 units lispro SS      Pt reports the following symptoms:    CONSTITUTIONAL:  Negative fever or chills, feels well, good appetite  EYES:  Negative  blurry vision or double vision  CARDIOVASCULAR:  Negative for chest pain or palpitations  RESPIRATORY:  Negative for cough, wheezing, or SOB   GASTROINTESTINAL:  Negative for nausea, vomiting, diarrhea, constipation, or abdominal pain  GENITOURINARY:  Negative frequency, urgency or dysuria  NEUROLOGIC:  No headache, confusion, dizziness, lightheadedness    MEDICATIONS  (STANDING):  dextrose 40% Gel 15 Gram(s) Oral once  dextrose 5%. 1000 milliLiter(s) (50 mL/Hr) IV Continuous <Continuous>  dextrose 5%. 1000 milliLiter(s) (100 mL/Hr) IV Continuous <Continuous>  dextrose 50% Injectable 25 Gram(s) IV Push once  dextrose 50% Injectable 12.5 Gram(s) IV Push once  dextrose 50% Injectable 25 Gram(s) IV Push once  glucagon  Injectable 1 milliGRAM(s) IntraMuscular once  heparin   Injectable 5000 Unit(s) SubCutaneous every 8 hours  insulin glargine Injectable (LANTUS) 14 Unit(s) SubCutaneous at bedtime  insulin lispro (ADMELOG) corrective regimen sliding scale   SubCutaneous Before meals and at bedtime  insulin lispro Injectable (ADMELOG) 8 Unit(s) SubCutaneous three times a day before meals  sodium chloride 0.9%. 1000 milliLiter(s) (125 mL/Hr) IV Continuous <Continuous>    MEDICATIONS  (PRN):  ondansetron Injectable 4 milliGRAM(s) IV Push every 6 hours PRN Nausea and/or Vomiting  oxycodone    5 mG/acetaminophen 325 mG 1 Tablet(s) Oral every 4 hours PRN Mild Pain (1 - 3)  oxycodone    5 mG/acetaminophen 325 mG 2 Tablet(s) Oral every 6 hours PRN Moderate Pain (4 - 6)      Past medical history reviewed  Family history reviewed  Social history reviewed    PHYSICAL EXAM  Vital Signs Last 24 Hrs  T(C): 37 (2021 12:36), Max: 37 (2021 12:36)  T(F): 98.6 (2021 12:36), Max: 98.6 (2021 12:36)  HR: 77 (2021 12:36) (68 - 97)  BP: 121/82 (2021 12:36) (107/69 - 121/82)  BP(mean): 93 (2021 14:01) (90 - 93)  RR: 16 (2021 12:36) (14 - 18)  SpO2: 97% (2021 12:36) (96% - 100%)    Constitutional:  in NAD.   HEENT:  no proptosis or lid retraction  Neck: no thyromegaly or palpable thyroid nodules   Respiratory: lungs CTAB.  Cardiovascular: regular rhythm, normal S1 and S2  GI: soft, NT/ND, no masses/HSM appreciated.  Neurology: no tremors, DTR 2+  Skin: no visible rashes/lesions  Psychiatric: AAO x 3, normal affect/mood.    LABS:                        13.2   6.06  )-----------( 165      ( 2021 08:17 )             37.9     07-03    137  |  104  |  17  ----------------------------<  221<H>  4.0   |  24  |  0.95    Ca    8.7      2021 08:17  Phos  3.6     07-  Mg     2.0     07-              HbA1C: 11.9 % ( @ 08:17)  12.0 % ( @ 18:54)      CAPILLARY BLOOD GLUCOSE      POCT Blood Glucose.: 339 mg/dL (2021 12:13)  POCT Blood Glucose.: 239 mg/dL (2021 06:48)  POCT Blood Glucose.: 429 mg/dL (2021 00:06)  POCT Blood Glucose.: 450 mg/dL (2021 22:26)  POCT Blood Glucose.: 181 mg/dL (2021 15:03)  POCT Blood Glucose.: 207 mg/dL (2021 13:56)      Cholesterol, Serum: 148 mg/dL (21 @ 08:17)  HDL Cholesterol, Serum: 28 mg/dL (21 @ 08:17)  Triglycerides, Serum: 105 mg/dL (21 @ 08:17)    A/P: 36yM with h/o kidney stones and DM1 (Diagnosed @ age 34, a1c unknown), presents to the ED c/o worsening L flank pain and hematuria, endocrinology consulted for DM1 insulin management. He was found to have elevated BGS in the high 200-300s now on sliding scale since 2021 .His preop assessment for left perc nephrolithotomy was canceled d/t BGS of 390.     1.  DM type 1  A1c:11.9%  wt:79 ,BMI: 23  Please give Lantus     units at night.  Please continue lispro 8 units before each meal.  Please continue lispro low dose sliding scale four times daily with meals and at bedtime    Pt's fingerstick glucose goal is 140-180mg/dL    Will continue to monitor     case seen and discussed w/Dr. Valdivia.    For discharge, pt can continue to follow with his endocrinologist Dr. Barron      INTERVAL HPI/OVERNIGHT EVENTS:    Patient is a 36y old  Male who presents with a chief complaint of Left Renal stone (2021 06:05)  Pt was seen and examined at the bedside.   Afebrile. No acute events overnight.   He reports flank pain   A1c: 11.9%.   He ate omelet with wheat bread for breakfast   FSG & Insulin received:    Yesterday:  pre-dinner fs  2  units lispro SS, didnt receive pre meals insulin , ate diner.  bedtime fs  lantus 14  units +  6  units lispro SS  Today:  pre-breakfast fs  nutritional lispro 8  units+   2 units lispro SS  pre-lunch fs  nutritional lispro 8  units+  4 units lispro SS      Pt reports the following symptoms:    CONSTITUTIONAL:  Negative fever or chills, feels well, good appetite  EYES:  Negative  blurry vision or double vision  CARDIOVASCULAR:  Negative for chest pain or palpitations  RESPIRATORY:  Negative for cough, wheezing, or SOB   GASTROINTESTINAL:  Negative for nausea, vomiting, diarrhea, constipation, or abdominal pain  GENITOURINARY:  Negative frequency, urgency or dysuria  NEUROLOGIC:  No headache, confusion, dizziness, lightheadedness    MEDICATIONS  (STANDING):  dextrose 40% Gel 15 Gram(s) Oral once  dextrose 5%. 1000 milliLiter(s) (50 mL/Hr) IV Continuous <Continuous>  dextrose 5%. 1000 milliLiter(s) (100 mL/Hr) IV Continuous <Continuous>  dextrose 50% Injectable 25 Gram(s) IV Push once  dextrose 50% Injectable 12.5 Gram(s) IV Push once  dextrose 50% Injectable 25 Gram(s) IV Push once  glucagon  Injectable 1 milliGRAM(s) IntraMuscular once  heparin   Injectable 5000 Unit(s) SubCutaneous every 8 hours  insulin glargine Injectable (LANTUS) 14 Unit(s) SubCutaneous at bedtime  insulin lispro (ADMELOG) corrective regimen sliding scale   SubCutaneous Before meals and at bedtime  insulin lispro Injectable (ADMELOG) 8 Unit(s) SubCutaneous three times a day before meals  sodium chloride 0.9%. 1000 milliLiter(s) (125 mL/Hr) IV Continuous <Continuous>    MEDICATIONS  (PRN):  ondansetron Injectable 4 milliGRAM(s) IV Push every 6 hours PRN Nausea and/or Vomiting  oxycodone    5 mG/acetaminophen 325 mG 1 Tablet(s) Oral every 4 hours PRN Mild Pain (1 - 3)  oxycodone    5 mG/acetaminophen 325 mG 2 Tablet(s) Oral every 6 hours PRN Moderate Pain (4 - 6)      Past medical history reviewed  Family history reviewed  Social history reviewed    PHYSICAL EXAM  Vital Signs Last 24 Hrs  T(C): 37 (2021 12:36), Max: 37 (2021 12:36)  T(F): 98.6 (2021 12:36), Max: 98.6 (2021 12:36)  HR: 77 (2021 12:36) (68 - 97)  BP: 121/82 (2021 12:36) (107/69 - 121/82)  BP(mean): 93 (2021 14:01) (90 - 93)  RR: 16 (2021 12:36) (14 - 18)  SpO2: 97% (2021 12:36) (96% - 100%)    Constitutional:  in NAD.   HEENT:  no proptosis or lid retraction  Neck: no thyromegaly or palpable thyroid nodules   Respiratory: lungs CTAB.  Cardiovascular: regular rhythm, normal S1 and S2  GI: soft, NT/ND, no masses/HSM appreciated.  Neurology: no tremors, DTR 2+  Skin: no visible rashes/lesions  Psychiatric: AAO x 3, normal affect/mood.    LABS:                        13.2   6.06  )-----------( 165      ( 2021 08:17 )             37.9     07-03    137  |  104  |  17  ----------------------------<  221<H>  4.0   |  24  |  0.95    Ca    8.7      2021 08:17  Phos  3.6     07-  Mg     2.0     07-              HbA1C: 11.9 % ( @ 08:17)  12.0 % ( @ 18:54)      CAPILLARY BLOOD GLUCOSE      POCT Blood Glucose.: 339 mg/dL (2021 12:13)  POCT Blood Glucose.: 239 mg/dL (2021 06:48)  POCT Blood Glucose.: 429 mg/dL (2021 00:06)  POCT Blood Glucose.: 450 mg/dL (2021 22:26)  POCT Blood Glucose.: 181 mg/dL (2021 15:03)  POCT Blood Glucose.: 207 mg/dL (2021 13:56)      Cholesterol, Serum: 148 mg/dL (21 @ 08:17)  HDL Cholesterol, Serum: 28 mg/dL (21 @ 08:17)  Triglycerides, Serum: 105 mg/dL (21 @ 08:17)    A/P: 36yM with h/o kidney stones and DM1 (Diagnosed @ age 34, a1c unknown), presents to the ED c/o worsening L flank pain and hematuria, endocrinology consulted for DM1 insulin management. He was found to have elevated BGS in the high 200-300s now on sliding scale since 2021 .His preop assessment for left perc nephrolithotomy was canceled d/t BGS of 390.     1.  DM type 1  A1c:11.9%  wt:79 ,BMI: 23  Please give Lantus  16   units at night.  Please give lispro 10 units before each meal.  Please continue lispro moderate dose sliding scale four times daily with meals and at bedtime    Pt's fingerstick glucose goal is 140-180mg/dL    Will continue to monitor     case seen and discussed w/Dr. Valdivia.    For discharge, pt can continue to follow with his endocrinologist Dr. Barron

## 2021-07-03 NOTE — PROGRESS NOTE ADULT - SUBJECTIVE AND OBJECTIVE BOX
INTERVAL HPI/OVERNIGHT EVENTS:  No acute events overnight.    VITALS:    T(F): 97.5 (07-03-21 @ 05:55), Max: 98 (07-02-21 @ 15:02)  HR: 97 (07-03-21 @ 05:55) (65 - 97)  BP: 107/69 (07-03-21 @ 05:55) (107/69 - 135/74)  RR: 18 (07-03-21 @ 05:55) (14 - 22)  SpO2: 98% (07-03-21 @ 05:55) (96% - 100%)  Wt(kg): --    I&O's Detail    02 Jul 2021 07:01  -  03 Jul 2021 06:07  --------------------------------------------------------  IN:    IV PiggyBack: 50 mL  Total IN: 50 mL    OUT:    Drain (mL): 35 mL    Indwelling Catheter - Urethral (mL): 2090 mL    Voided (mL): 600 mL  Total OUT: 2725 mL    Total NET: -2675 mL          MEDICATIONS:    ANTIBIOTICS:  ceFAZolin   IVPB 1000 milliGRAM(s) IV Intermittent every 8 hours      PAIN CONTROL:  ondansetron Injectable 4 milliGRAM(s) IV Push every 6 hours PRN  oxycodone    5 mG/acetaminophen 325 mG 1 Tablet(s) Oral every 4 hours PRN  oxycodone    5 mG/acetaminophen 325 mG 2 Tablet(s) Oral every 6 hours PRN       MEDS:      HEME/ONC  heparin   Injectable 5000 Unit(s) SubCutaneous every 8 hours        PHYSICAL EXAM:  General: No acute distress.  Alert and Oriented  Abdominal Exam: soft nt/nd.   Exam: Calix catheter in place draining blood tinged output.       LABS:                        13.4   6.37  )-----------( 159      ( 02 Jul 2021 13:01 )             37.9     07-02    136  |  102  |  16  ----------------------------<  245<H>  4.6   |  26  |  1.23    Ca    8.8      02 Jul 2021 13:01  Phos  3.6     07-02  Mg     2.0     07-02            RADIOLOGY & ADDITIONAL TESTS:    ASSESSMENT: 36yMale Left Renal stone s/p Left PCNL      PLAN:  Diet: Reg  Pain control  Monitor Urine Output  DVT ppx  Cont Abx  OOB/IS

## 2021-07-03 NOTE — DISCHARGE NOTE NURSING/CASE MANAGEMENT/SOCIAL WORK - NSTOBACCONEVERSMOKERY/N_GEN_A
Admission Reconciliation is Not Complete  Discharge Reconciliation is Not Complete Yes Admission Reconciliation is Completed  Discharge Reconciliation is Completed

## 2021-07-03 NOTE — DISCHARGE NOTE PROVIDER - NSDCFUADDINST_GEN_ALL_CORE_FT
Please call fever > 101, chills,  Please call fever > 101, chills, n/v, SOB or CP    You can change the dressing as needed, you may experience a little drainage at the site    Blood in the Urine (Hematuria): You may experience blood intermittently in the urine with or without small clots due to the stent. This is to be expected. If you have large clots/worsening hematuria or unable to urinate go to the ER    Bowel Movements: It is important to keep your bowels regular during the postoperative period.  If you need take Miralax OTC    Urinary Symptoms: You may experience urinary urgency, urinary frequency and discomfort with  voiding.  Should you develop severe lower  abdominal pain, difficulty voiding, fevers or bleeding as described above, please contact my  office or go to your nearest Emergency Department.    Pain Control: You can expect to have very little pain from this procedure. Generally, Tylenol or Extra-Strength Tylenol or an anti-inflammatory will be sufficient to control any discomfort you may have   Please call fever > 101, chills, n/v, SOB or CP    You can change the dressing as needed, you may experience a little drainage at the site    Blood in the Urine (Hematuria): You may experience blood intermittently in the urine with or without small clots. This is to be expected. If you have large clots/worsening hematuria or unable to urinate go to the ER    Bowel Movements: It is important to keep your bowels regular during the postoperative period.  If you need take Miralax OTC    Urinary Symptoms: You may experience urinary urgency, urinary frequency and discomfort with  voiding.  Should you develop severe lower  abdominal pain, difficulty voiding, fevers or bleeding as described above, please contact my  office or go to your nearest Emergency Department.    Pain Control: You can expect to have very little pain from this procedure. Generally, Tylenol or Extra-Strength Tylenol or an anti-inflammatory will be sufficient to control any discomfort you may have

## 2021-07-03 NOTE — PROGRESS NOTE ADULT - ATTENDING COMMENTS
36yoM with h/o Type 1 DM, kidney stones, admitted for L flank pain and hematuria, CT showing L 2cm UPJ stone, s/p left perc nephrolithotomy 7/2/21, pending discharge today. At home he was on Tresiba 14u and Afrezza 12u qAc. Has had persistent hyperglycemia. Agree with increase in lantus to 16u qhs, short acting insulin to 10u qAc.    We had a discussion with the patient today about counting carbs and dosing short acting insulin to cover carbs and correct hyperglycemia. He said he had Calorie Tate on his phone and will follow up with his private endocrinologist Dr. Barron soon after discharge to determine insulin to carb ratio and correction factor. Until then, he can stay on Afrezza 10-12u qAc depending on the size and carb content of the meal.     Goldie Valdivia MD  Endocrinology Attending    I spent 35 minutes on the total encounter. More than 50% of the visit was spent counseling and/or coordinating care by me, the attending physician.

## 2021-07-03 NOTE — DISCHARGE NOTE PROVIDER - HOSPITAL COURSE
36M admitted  He tolerated the procedure well. He passed his TOV 36M admitted with c/o worsening L flank pain and hematuria.  He had CT a/p c/w 2cm L UPJ stone with moderate hydronephrosis s/p LPCNL on 7/2. WBC and Cr nml. Patient feels better with pain medications.  He tolerated the procedure well. He was able to ambulated and tolerate a diet. He passed his TOV and his PCN was removed with no issues. VSS, hemodynamically stable for discharge

## 2021-07-03 NOTE — DISCHARGE NOTE PROVIDER - NSDCCPCAREPLAN_GEN_ALL_CORE_FT
PRINCIPAL DISCHARGE DIAGNOSIS  Diagnosis: Left renal stone  Assessment and Plan of Treatment:

## 2021-07-03 NOTE — DISCHARGE NOTE PROVIDER - NSDCCPTREATMENT_GEN_ALL_CORE_FT
PRINCIPAL PROCEDURE  Procedure: Nephrolithotomy, percutaneous, with C-arm fluoroscopic guidance  Findings and Treatment:       SECONDARY PROCEDURE  Procedure: Nephrolithotomy, percutaneous, with C-arm fluoroscopic guidance  Findings and Treatment:

## 2021-07-03 NOTE — DISCHARGE NOTE NURSING/CASE MANAGEMENT/SOCIAL WORK - NSPROEXTENSIONSOFSELF_GEN_A_NUR
Rabago Breanna was seen and treated in our emergency department on 6/15/2021  Diagnosis:     Nancy Carolina  may return to work on return date  She may return on this date: 06/17/2021         If you have any questions or concerns, please don't hesitate to call        Marce Hogan RN    ______________________________           _______________          _______________  Hospital Representative                              Date                                Time none

## 2021-07-03 NOTE — DISCHARGE NOTE PROVIDER - NSDCMRMEDTOKEN_GEN_ALL_CORE_FT
Adderall 20 mg oral tablet: 1 tab(s) orally 2 times a day  Afrezza 4 units inhalation powder:   cefpodoxime 200 mg oral tablet: 1 tab(s) orally every 12 hours   lisinopril 5 mg oral tablet: 1 tab(s) orally once a day  Tresiba 100 units/mL subcutaneous solution:    Adderall 20 mg oral tablet: 1 tab(s) orally 2 times a day  Afrezza 4 units inhalation powder:   lisinopril 5 mg oral tablet: 1 tab(s) orally once a day  Tresiba 100 units/mL subcutaneous solution:    Adderall 20 mg oral tablet: 1 tab(s) orally 2 times a day  Afrezza 4 units inhalation powder:   lisinopril 5 mg oral tablet: 1 tab(s) orally once a day  oxycodone-acetaminophen 5 mg-325 mg oral tablet: 1 tab(s) orally every 6 hours, As Needed -for severe pain MDD:4   Tresiba 100 units/mL subcutaneous solution:

## 2021-07-03 NOTE — DISCHARGE NOTE NURSING/CASE MANAGEMENT/SOCIAL WORK - PATIENT PORTAL LINK FT
You can access the FollowMyHealth Patient Portal offered by [facility] by registering at the following website: [email or web address]. By joining [facility]’s FollowMyHealth portal, you will also be able to view your health information using other applications (apps) compatible with our system.

## 2021-07-06 LAB — GRAM STN FLD: SIGNIFICANT CHANGE UP

## 2021-07-07 LAB
CULTURE RESULTS: NO GROWTH — SIGNIFICANT CHANGE UP
SPECIMEN SOURCE: SIGNIFICANT CHANGE UP

## 2021-07-08 LAB — GRAM STN FLD: SIGNIFICANT CHANGE UP

## 2021-07-09 ENCOUNTER — APPOINTMENT (OUTPATIENT)
Dept: UROLOGY | Facility: CLINIC | Age: 36
End: 2021-07-09
Payer: COMMERCIAL

## 2021-07-09 VITALS — TEMPERATURE: 98.7 F | HEART RATE: 94 BPM | SYSTOLIC BLOOD PRESSURE: 138 MMHG | DIASTOLIC BLOOD PRESSURE: 93 MMHG

## 2021-07-09 LAB
BILIRUB UR QL STRIP: NORMAL
CLARITY UR: CLEAR
COLLECTION METHOD: NORMAL
GLUCOSE UR-MCNC: NORMAL
HCG UR QL: 0.2 EU/DL
HGB UR QL STRIP.AUTO: NORMAL
KETONES UR-MCNC: NORMAL
LEUKOCYTE ESTERASE UR QL STRIP: NORMAL
NITRITE UR QL STRIP: NORMAL
PH UR STRIP: 5.5
PROT UR STRIP-MCNC: NORMAL
SP GR UR STRIP: <=1.005

## 2021-07-09 PROCEDURE — 99024 POSTOP FOLLOW-UP VISIT: CPT

## 2021-07-09 NOTE — ASSESSMENT
[FreeTextEntry1] : 37 yo male s/p left PCNL.  He is feeling very well.  We will leave thew stent in another few days to allow for continued resolution of left UPJ edema which was noted at the time of PCNL secondary to the imp[acted 2 cm stone.  We will remove the stent on Monday.  His leakage is likely secondary to reflux of urine up the stent and out the previous PCN site.  This should resolve once the stent is removed.  He will change his dressing prn.

## 2021-07-09 NOTE — PHYSICAL EXAM
[General Appearance - Well Developed] : well developed [Normal Appearance] : normal appearance [Abdomen Soft] : soft [Abdomen Tenderness] : non-tender [FreeTextEntry1] : Left PCN site, with small amount of urine draining from perc site, no erythema or tenderness around site.  [Skin Color & Pigmentation] : normal skin color and pigmentation [Heart Rate And Rhythm] : Heart rate and rhythm were normal [] : no respiratory distress [Oriented To Time, Place, And Person] : oriented to person, place, and time [Normal Station and Gait] : the gait and station were normal for the patient's age [No Focal Deficits] : no focal deficits

## 2021-07-09 NOTE — HISTORY OF PRESENT ILLNESS
[FreeTextEntry1] : 37 yo male s/p left PCNL for 2 cm left UPJ stone.  He has been feeling well with no pain.  He is voiding well.  Hematuria has largely resolved.  He denies any fevers or chills.  He has noticed increased drainage from the left PCN site in the last day or two.  He is eating well and having regular bowel function.

## 2021-07-12 ENCOUNTER — APPOINTMENT (OUTPATIENT)
Dept: UROLOGY | Facility: CLINIC | Age: 36
End: 2021-07-12
Payer: COMMERCIAL

## 2021-07-12 VITALS
SYSTOLIC BLOOD PRESSURE: 130 MMHG | TEMPERATURE: 97.9 F | HEIGHT: 72 IN | DIASTOLIC BLOOD PRESSURE: 80 MMHG | BODY MASS INDEX: 24.38 KG/M2 | WEIGHT: 180 LBS | HEART RATE: 112 BPM

## 2021-07-12 DIAGNOSIS — Z00.00 ENCOUNTER FOR GENERAL ADULT MEDICAL EXAMINATION W/OUT ABNORMAL FINDINGS: ICD-10-CM

## 2021-07-12 DIAGNOSIS — N20.0 CALCULUS OF KIDNEY: ICD-10-CM

## 2021-07-12 LAB
BILIRUB UR QL STRIP: NORMAL
CLARITY UR: CLEAR
COLLECTION METHOD: NORMAL
GLUCOSE UR-MCNC: 250
HCG UR QL: 0.2 EU/DL
HGB UR QL STRIP.AUTO: NORMAL
KETONES UR-MCNC: NORMAL
LEUKOCYTE ESTERASE UR QL STRIP: NORMAL
NITRITE UR QL STRIP: NORMAL
PH UR STRIP: 6
PROT UR STRIP-MCNC: 100
SP GR UR STRIP: 1.02

## 2021-07-12 PROCEDURE — 52310 CYSTOSCOPY AND TREATMENT: CPT | Mod: 58

## 2021-07-12 PROCEDURE — 99072 ADDL SUPL MATRL&STAF TM PHE: CPT

## 2021-07-12 PROCEDURE — 99212 OFFICE O/P EST SF 10 MIN: CPT | Mod: 25

## 2021-07-12 RX ORDER — CIPROFLOXACIN HYDROCHLORIDE 500 MG/1
500 TABLET, FILM COATED ORAL ONCE
Qty: 1 | Refills: 0 | Status: ACTIVE | COMMUNITY
Start: 2021-07-12 | End: 1900-01-01

## 2021-07-13 LAB
CALCIUM SERPL-MCNC: 9.8 MG/DL
SURGICAL PATHOLOGY STUDY: SIGNIFICANT CHANGE UP
URATE SERPL-MCNC: 2.6 MG/DL

## 2021-07-14 LAB
CALCIUM SERPL-MCNC: 9.8 MG/DL
PARATHYROID HORMONE INTACT: 21 PG/ML

## 2021-07-15 DIAGNOSIS — N13.2 HYDRONEPHROSIS WITH RENAL AND URETERAL CALCULOUS OBSTRUCTION: ICD-10-CM

## 2021-07-15 DIAGNOSIS — E10.65 TYPE 1 DIABETES MELLITUS WITH HYPERGLYCEMIA: ICD-10-CM

## 2021-07-15 DIAGNOSIS — N20.0 CALCULUS OF KIDNEY: ICD-10-CM

## 2021-07-15 DIAGNOSIS — F90.9 ATTENTION-DEFICIT HYPERACTIVITY DISORDER, UNSPECIFIED TYPE: ICD-10-CM

## 2021-07-15 DIAGNOSIS — E78.5 HYPERLIPIDEMIA, UNSPECIFIED: ICD-10-CM

## 2021-07-15 LAB — NIDUS STONE QN: SIGNIFICANT CHANGE UP

## 2021-07-28 DIAGNOSIS — D45 POLYCYTHEMIA VERA: ICD-10-CM

## 2021-07-28 DIAGNOSIS — N20.0 CALCULUS OF KIDNEY: ICD-10-CM

## 2021-07-31 ENCOUNTER — TRANSCRIPTION ENCOUNTER (OUTPATIENT)
Age: 36
End: 2021-07-31

## 2021-08-16 ENCOUNTER — APPOINTMENT (OUTPATIENT)
Dept: UROLOGY | Facility: CLINIC | Age: 36
End: 2021-08-16
Payer: COMMERCIAL

## 2021-08-16 VITALS
HEIGHT: 72 IN | TEMPERATURE: 98 F | SYSTOLIC BLOOD PRESSURE: 151 MMHG | WEIGHT: 180 LBS | OXYGEN SATURATION: 99 % | HEART RATE: 90 BPM | BODY MASS INDEX: 24.38 KG/M2 | DIASTOLIC BLOOD PRESSURE: 96 MMHG

## 2021-08-16 DIAGNOSIS — Z87.442 PERSONAL HISTORY OF URINARY CALCULI: ICD-10-CM

## 2021-08-16 PROCEDURE — 99024 POSTOP FOLLOW-UP VISIT: CPT

## 2021-08-16 NOTE — HISTORY OF PRESENT ILLNESS
[FreeTextEntry1] : 35 yo male returns for post  PCNL visit s/p left PCNL. He did not get a chance to do the 24 hour urine collection.  He is otherwise feeling well and is without complaint.

## 2021-08-16 NOTE — PHYSICAL EXAM
[General Appearance - Well Developed] : well developed [Normal Appearance] : normal appearance [Abdomen Soft] : soft [Skin Color & Pigmentation] : normal skin color and pigmentation [Heart Rate And Rhythm] : Heart rate and rhythm were normal [] : no respiratory distress [Oriented To Time, Place, And Person] : oriented to person, place, and time [Normal Station and Gait] : the gait and station were normal for the patient's age [No Focal Deficits] : no focal deficits [FreeTextEntry1] : well healed left flank PCN incision site

## 2021-08-16 NOTE — ASSESSMENT
[FreeTextEntry1] : 35 yo male sp l;eft PCNL.  Will have him return in 3 weeks after completing his 24 hour urine collection x 2 for renal US and review of results.

## 2021-09-10 ENCOUNTER — APPOINTMENT (OUTPATIENT)
Dept: UROLOGY | Facility: CLINIC | Age: 36
End: 2021-09-10

## 2021-09-23 ENCOUNTER — APPOINTMENT (OUTPATIENT)
Dept: UROLOGY | Facility: CLINIC | Age: 36
End: 2021-09-23

## 2021-10-13 NOTE — ED ADULT TRIAGE NOTE - NS ED NURSE BANDS TYPE
Received recent office notes from [facility]. Notes are scanned in pt chart for review thank you.   Name band;

## 2021-10-20 NOTE — ASU PREOP CHECKLIST - ALLERGIES REVIEWED
done Enbrel Counseling:  I discussed with the patient the risks of etanercept including but not limited to myelosuppression, immunosuppression, autoimmune hepatitis, demyelinating diseases, lymphoma, and infections.  The patient understands that monitoring is required including a PPD at baseline and must alert us or the primary physician if symptoms of infection or other concerning signs are noted.

## 2022-04-04 ENCOUNTER — TRANSCRIPTION ENCOUNTER (OUTPATIENT)
Age: 37
End: 2022-04-04

## 2024-04-22 ENCOUNTER — NON-APPOINTMENT (OUTPATIENT)
Age: 39
End: 2024-04-22

## 2024-12-03 ENCOUNTER — NON-APPOINTMENT (OUTPATIENT)
Age: 39
End: 2024-12-03
